# Patient Record
Sex: MALE | Race: WHITE | NOT HISPANIC OR LATINO | Employment: FULL TIME | URBAN - METROPOLITAN AREA
[De-identification: names, ages, dates, MRNs, and addresses within clinical notes are randomized per-mention and may not be internally consistent; named-entity substitution may affect disease eponyms.]

---

## 2017-01-26 ENCOUNTER — HOSPITAL ENCOUNTER (OUTPATIENT)
Dept: GENERAL RADIOLOGY | Facility: HOSPITAL | Age: 47
Discharge: HOME | End: 2017-01-26
Attending: FAMILY MEDICINE | Admitting: FAMILY MEDICINE

## 2017-01-26 ENCOUNTER — TRANSCRIBE ORDERS (OUTPATIENT)
Dept: GENERAL RADIOLOGY | Facility: HOSPITAL | Age: 47
End: 2017-01-26

## 2017-01-26 DIAGNOSIS — R07.9 CHEST PAIN, UNSPECIFIED: ICD-10-CM

## 2017-01-26 DIAGNOSIS — R07.9 CHEST PAIN, UNSPECIFIED: Primary | ICD-10-CM

## 2017-01-26 PROCEDURE — 71020 HC CHEST PA AND LATERAL: CPT

## 2021-03-04 ENCOUNTER — IMMUNIZATION (OUTPATIENT)
Dept: VACCINE CLINIC | Facility: HOSPITAL | Age: 51
End: 2021-03-04

## 2021-03-04 PROCEDURE — 91301 HC SARSCO02 VAC 100MCG/0.5ML IM: CPT | Performed by: INTERNAL MEDICINE

## 2021-03-04 PROCEDURE — 0011A: CPT | Performed by: INTERNAL MEDICINE

## 2021-04-01 ENCOUNTER — IMMUNIZATION (OUTPATIENT)
Dept: VACCINE CLINIC | Facility: HOSPITAL | Age: 51
End: 2021-04-01

## 2021-04-01 PROCEDURE — 0012A: CPT | Performed by: INTERNAL MEDICINE

## 2021-04-01 PROCEDURE — 91301 HC SARSCO02 VAC 100MCG/0.5ML IM: CPT | Performed by: INTERNAL MEDICINE

## 2023-09-29 ENCOUNTER — TRANSCRIBE ORDERS (OUTPATIENT)
Dept: ADMINISTRATIVE | Facility: HOSPITAL | Age: 53
End: 2023-09-29
Payer: COMMERCIAL

## 2023-09-29 ENCOUNTER — HOSPITAL ENCOUNTER (OUTPATIENT)
Dept: GENERAL RADIOLOGY | Facility: HOSPITAL | Age: 53
Discharge: HOME | End: 2023-09-29
Admitting: STUDENT IN AN ORGANIZED HEALTH CARE EDUCATION/TRAINING PROGRAM
Payer: COMMERCIAL

## 2023-09-29 DIAGNOSIS — M79.672 PAIN OF LEFT HEEL: Primary | ICD-10-CM

## 2023-09-29 PROCEDURE — 73630 X-RAY EXAM OF FOOT: CPT

## 2025-05-13 ENCOUNTER — HOSPITAL ENCOUNTER (EMERGENCY)
Facility: HOSPITAL | Age: 55
Discharge: HOME | End: 2025-05-13
Attending: EMERGENCY MEDICINE | Admitting: EMERGENCY MEDICINE
Payer: COMMERCIAL

## 2025-05-13 ENCOUNTER — APPOINTMENT (OUTPATIENT)
Dept: CT IMAGING | Facility: HOSPITAL | Age: 55
End: 2025-05-13
Payer: COMMERCIAL

## 2025-05-13 VITALS
TEMPERATURE: 97.8 F | BODY MASS INDEX: 33.64 KG/M2 | SYSTOLIC BLOOD PRESSURE: 138 MMHG | WEIGHT: 235 LBS | HEIGHT: 70 IN | DIASTOLIC BLOOD PRESSURE: 85 MMHG | HEART RATE: 63 BPM | OXYGEN SATURATION: 97 % | RESPIRATION RATE: 18 BRPM

## 2025-05-13 DIAGNOSIS — N13.39 OTHER HYDRONEPHROSIS: ICD-10-CM

## 2025-05-13 DIAGNOSIS — N20.1 URETEROLITHIASIS: Primary | ICD-10-CM

## 2025-05-13 LAB
ALBUMIN SERPL-MCNC: 4.2 G/DL (ref 3.5–5.2)
ALBUMIN/GLOB SERPL: 1.7 G/DL
ALP SERPL-CCNC: 52 U/L (ref 39–117)
ALT SERPL W P-5'-P-CCNC: 19 U/L (ref 1–41)
ANION GAP SERPL CALCULATED.3IONS-SCNC: 12 MMOL/L (ref 5–15)
AST SERPL-CCNC: 24 U/L (ref 1–40)
BACTERIA UR QL AUTO: ABNORMAL /HPF
BASOPHILS # BLD AUTO: 0.03 10*3/MM3 (ref 0–0.2)
BASOPHILS NFR BLD AUTO: 0.3 % (ref 0–1.5)
BILIRUB SERPL-MCNC: 0.5 MG/DL (ref 0–1.2)
BILIRUB UR QL STRIP: NEGATIVE
BUN SERPL-MCNC: 16 MG/DL (ref 6–20)
BUN/CREAT SERPL: 12.8 (ref 7–25)
CALCIUM SPEC-SCNC: 9.4 MG/DL (ref 8.6–10.5)
CHLORIDE SERPL-SCNC: 107 MMOL/L (ref 98–107)
CLARITY UR: CLEAR
CO2 SERPL-SCNC: 24 MMOL/L (ref 22–29)
COLOR UR: YELLOW
CREAT BLDA-MCNC: 1.4 MG/DL (ref 0.6–1.3)
CREAT SERPL-MCNC: 1.25 MG/DL (ref 0.76–1.27)
DEPRECATED RDW RBC AUTO: 41 FL (ref 37–54)
EGFRCR SERPLBLD CKD-EPI 2021: 67.6 ML/MIN/1.73
EOSINOPHIL # BLD AUTO: 0.02 10*3/MM3 (ref 0–0.4)
EOSINOPHIL NFR BLD AUTO: 0.2 % (ref 0.3–6.2)
ERYTHROCYTE [DISTWIDTH] IN BLOOD BY AUTOMATED COUNT: 13.1 % (ref 12.3–15.4)
GLOBULIN UR ELPH-MCNC: 2.5 GM/DL
GLUCOSE SERPL-MCNC: 90 MG/DL (ref 65–99)
GLUCOSE UR STRIP-MCNC: NEGATIVE MG/DL
HCT VFR BLD AUTO: 43.6 % (ref 37.5–51)
HGB BLD-MCNC: 15 G/DL (ref 13–17.7)
HGB UR QL STRIP.AUTO: ABNORMAL
HYALINE CASTS UR QL AUTO: ABNORMAL /LPF
IMM GRANULOCYTES # BLD AUTO: 0.04 10*3/MM3 (ref 0–0.05)
IMM GRANULOCYTES NFR BLD AUTO: 0.4 % (ref 0–0.5)
KETONES UR QL STRIP: ABNORMAL
LEUKOCYTE ESTERASE UR QL STRIP.AUTO: NEGATIVE
LIPASE SERPL-CCNC: 25 U/L (ref 13–60)
LYMPHOCYTES # BLD AUTO: 0.54 10*3/MM3 (ref 0.7–3.1)
LYMPHOCYTES NFR BLD AUTO: 5.9 % (ref 19.6–45.3)
MCH RBC QN AUTO: 29.8 PG (ref 26.6–33)
MCHC RBC AUTO-ENTMCNC: 34.4 G/DL (ref 31.5–35.7)
MCV RBC AUTO: 86.7 FL (ref 79–97)
MONOCYTES # BLD AUTO: 0.69 10*3/MM3 (ref 0.1–0.9)
MONOCYTES NFR BLD AUTO: 7.6 % (ref 5–12)
NEUTROPHILS NFR BLD AUTO: 7.79 10*3/MM3 (ref 1.7–7)
NEUTROPHILS NFR BLD AUTO: 85.6 % (ref 42.7–76)
NITRITE UR QL STRIP: NEGATIVE
NRBC BLD AUTO-RTO: 0 /100 WBC (ref 0–0.2)
PH UR STRIP.AUTO: <=5 [PH] (ref 5–8)
PLATELET # BLD AUTO: 254 10*3/MM3 (ref 140–450)
PMV BLD AUTO: 10.3 FL (ref 6–12)
POTASSIUM SERPL-SCNC: 4 MMOL/L (ref 3.5–5.2)
PROT SERPL-MCNC: 6.7 G/DL (ref 6–8.5)
PROT UR QL STRIP: NEGATIVE
RBC # BLD AUTO: 5.03 10*6/MM3 (ref 4.14–5.8)
RBC # UR STRIP: ABNORMAL /HPF
REF LAB TEST METHOD: ABNORMAL
SODIUM SERPL-SCNC: 143 MMOL/L (ref 136–145)
SP GR UR STRIP: 1.04 (ref 1–1.03)
SQUAMOUS #/AREA URNS HPF: ABNORMAL /HPF
UROBILINOGEN UR QL STRIP: ABNORMAL
WBC # UR STRIP: ABNORMAL /HPF
WBC NRBC COR # BLD AUTO: 9.11 10*3/MM3 (ref 3.4–10.8)

## 2025-05-13 PROCEDURE — 81001 URINALYSIS AUTO W/SCOPE: CPT | Performed by: EMERGENCY MEDICINE

## 2025-05-13 PROCEDURE — 82565 ASSAY OF CREATININE: CPT | Performed by: EMERGENCY MEDICINE

## 2025-05-13 PROCEDURE — 25010000002 HYDROMORPHONE PER 4 MG: Performed by: EMERGENCY MEDICINE

## 2025-05-13 PROCEDURE — 83690 ASSAY OF LIPASE: CPT | Performed by: EMERGENCY MEDICINE

## 2025-05-13 PROCEDURE — 25010000002 KETOROLAC TROMETHAMINE PER 15 MG: Performed by: EMERGENCY MEDICINE

## 2025-05-13 PROCEDURE — 25810000003 SODIUM CHLORIDE 0.9 % SOLUTION: Performed by: EMERGENCY MEDICINE

## 2025-05-13 PROCEDURE — 74177 CT ABD & PELVIS W/CONTRAST: CPT

## 2025-05-13 PROCEDURE — 25510000001 IOPAMIDOL 61 % SOLUTION: Performed by: EMERGENCY MEDICINE

## 2025-05-13 PROCEDURE — 99285 EMERGENCY DEPT VISIT HI MDM: CPT

## 2025-05-13 PROCEDURE — 96375 TX/PRO/DX INJ NEW DRUG ADDON: CPT

## 2025-05-13 PROCEDURE — 96374 THER/PROPH/DIAG INJ IV PUSH: CPT

## 2025-05-13 PROCEDURE — 87086 URINE CULTURE/COLONY COUNT: CPT | Performed by: FAMILY MEDICINE

## 2025-05-13 PROCEDURE — 80053 COMPREHEN METABOLIC PANEL: CPT | Performed by: EMERGENCY MEDICINE

## 2025-05-13 PROCEDURE — 25010000002 ONDANSETRON PER 1 MG: Performed by: EMERGENCY MEDICINE

## 2025-05-13 PROCEDURE — 85025 COMPLETE CBC W/AUTO DIFF WBC: CPT | Performed by: EMERGENCY MEDICINE

## 2025-05-13 RX ORDER — ONDANSETRON 4 MG/1
4 TABLET, FILM COATED ORAL EVERY 8 HOURS PRN
Qty: 15 TABLET | Refills: 0 | Status: SHIPPED | OUTPATIENT
Start: 2025-05-13

## 2025-05-13 RX ORDER — IOPAMIDOL 612 MG/ML
85 INJECTION, SOLUTION INTRAVASCULAR
Status: COMPLETED | OUTPATIENT
Start: 2025-05-13 | End: 2025-05-13

## 2025-05-13 RX ORDER — HYDROMORPHONE HYDROCHLORIDE 1 MG/ML
0.5 INJECTION, SOLUTION INTRAMUSCULAR; INTRAVENOUS; SUBCUTANEOUS ONCE
Refills: 0 | Status: COMPLETED | OUTPATIENT
Start: 2025-05-13 | End: 2025-05-13

## 2025-05-13 RX ORDER — SODIUM CHLORIDE 0.9 % (FLUSH) 0.9 %
10 SYRINGE (ML) INJECTION AS NEEDED
Status: DISCONTINUED | OUTPATIENT
Start: 2025-05-13 | End: 2025-05-13 | Stop reason: HOSPADM

## 2025-05-13 RX ORDER — KETOROLAC TROMETHAMINE 15 MG/ML
15 INJECTION, SOLUTION INTRAMUSCULAR; INTRAVENOUS ONCE
Status: COMPLETED | OUTPATIENT
Start: 2025-05-13 | End: 2025-05-13

## 2025-05-13 RX ORDER — ONDANSETRON 2 MG/ML
4 INJECTION INTRAMUSCULAR; INTRAVENOUS ONCE
Status: COMPLETED | OUTPATIENT
Start: 2025-05-13 | End: 2025-05-13

## 2025-05-13 RX ORDER — TAMSULOSIN HYDROCHLORIDE 0.4 MG/1
1 CAPSULE ORAL DAILY
Qty: 10 CAPSULE | Refills: 0 | Status: SHIPPED | OUTPATIENT
Start: 2025-05-13 | End: 2025-05-16 | Stop reason: HOSPADM

## 2025-05-13 RX ORDER — HYDROCODONE BITARTRATE AND ACETAMINOPHEN 5; 325 MG/1; MG/1
1 TABLET ORAL ONCE
Refills: 0 | Status: COMPLETED | OUTPATIENT
Start: 2025-05-13 | End: 2025-05-13

## 2025-05-13 RX ORDER — HYDROCODONE BITARTRATE AND ACETAMINOPHEN 5; 325 MG/1; MG/1
1 TABLET ORAL EVERY 6 HOURS PRN
Qty: 12 TABLET | Refills: 0 | Status: SHIPPED | OUTPATIENT
Start: 2025-05-13

## 2025-05-13 RX ADMIN — KETOROLAC TROMETHAMINE 15 MG: 15 INJECTION, SOLUTION INTRAMUSCULAR; INTRAVENOUS at 19:48

## 2025-05-13 RX ADMIN — ONDANSETRON 4 MG: 2 INJECTION INTRAMUSCULAR; INTRAVENOUS at 18:09

## 2025-05-13 RX ADMIN — IOPAMIDOL 85 ML: 612 INJECTION, SOLUTION INTRAVENOUS at 18:34

## 2025-05-13 RX ADMIN — HYDROMORPHONE HYDROCHLORIDE 0.5 MG: 1 INJECTION, SOLUTION INTRAMUSCULAR; INTRAVENOUS; SUBCUTANEOUS at 18:09

## 2025-05-13 RX ADMIN — HYDROCODONE BITARTRATE AND ACETAMINOPHEN 1 TABLET: 5; 325 TABLET ORAL at 19:48

## 2025-05-13 RX ADMIN — SODIUM CHLORIDE 1000 ML: 900 INJECTION, SOLUTION INTRAVENOUS at 18:09

## 2025-05-13 NOTE — Clinical Note
[facility] EMERGENCY DEPARTMENT  [address]  [facility] [address]  Phone: [phone]    Thierry Garduno was seen and treated in our emergency department on 5/13/2025.  He may return to work on 05/16/2025.         Thank you for choosing [facility].    Red Mendoza MD

## 2025-05-13 NOTE — ED PROVIDER NOTES
Subjective   History of Present Illness  54-year-old male sent to the emergency department from urgent care to be evaluated for right sided abdominal pain and flank pain.  The patient tells me that about 2 hours ago he was sitting in a chair and was at rest when he had acute onset of right lower quadrant abdominal pain and right flank pain that radiated to his groin.  He endorses accompanying nausea and vomiting.  No fevers.  No rash.  He currently rates his pain at 9 out of 10 in severity.  Given his symptoms, he went to urgent care and was subsequently referred here to the ED for further evaluation and treatment.      Review of Systems   Gastrointestinal:  Positive for abdominal pain, nausea and vomiting.   Genitourinary:  Positive for flank pain.   All other systems reviewed and are negative.      No past medical history on file.    No Known Allergies    No past surgical history on file.    No family history on file.    Social History     Socioeconomic History    Marital status:    Tobacco Use    Smoking status: Never    Smokeless tobacco: Never   Vaping Use    Vaping status: Never Used   Substance and Sexual Activity    Alcohol use: Yes    Drug use: Never    Sexual activity: Defer           Objective   Physical Exam  Vitals and nursing note reviewed.   Constitutional:       Appearance: He is well-developed. He is not diaphoretic.      Comments: Uncomfortable appearing male   HENT:      Head: Normocephalic and atraumatic.   Neck:      Vascular: No JVD.   Cardiovascular:      Rate and Rhythm: Normal rate and regular rhythm.      Heart sounds: Normal heart sounds. No murmur heard.     No friction rub. No gallop.   Pulmonary:      Effort: Pulmonary effort is normal. No respiratory distress.      Breath sounds: Normal breath sounds. No wheezing or rales.   Abdominal:      General: Bowel sounds are normal. There is no distension.      Palpations: Abdomen is soft. There is no mass.      Tenderness: There is no  abdominal tenderness. There is no guarding.      Comments: No focal abdominal tenderness, no peritoneal signs, no pain out of proportion to exam   Genitourinary:     Comments: No CVA tenderness present  Musculoskeletal:         General: Normal range of motion.      Cervical back: Normal range of motion.   Skin:     General: Skin is warm and dry.      Coloration: Skin is not pale.      Findings: No erythema or rash.      Comments: No dermatomal rash noted   Neurological:      Mental Status: He is alert and oriented to person, place, and time.   Psychiatric:         Mood and Affect: Mood normal.         Thought Content: Thought content normal.         Judgment: Judgment normal.         Procedures           ED Course  ED Course as of 05/13/25 2034   Tue May 13, 2025   1717 54-year-old male sent to the emergency department from urgent care to be evaluated for right sided abdominal pain and flank pain.  He tells me that about 2 hours ago he was sitting in a chair when he had acute onset of pain in his right lower quadrant and right flank that radiated to his right groin.  He endorses accompanying nausea and vomiting.  He currently rates his pain at 9 out of 10 in severity.  Given his symptoms, he went to urgent care and was referred here for further evaluation and treatment.  On arrival, the patient is uncomfortable appearing.  Nonsurgical abdomen.  No CVA tenderness noted.  No dermatomal rash present.  Differential diagnosis is quite broad.  We will obtain labs and imaging, and we will reassess following initial interventions. [DD]   1905 Labs interpreted independently by me remarkable only for hematuria. [DD]   1919 I personally and independently reviewed the patient's CT images and findings, and I am in agreement with the radiologist regarding CT interpretation--particularly regarding 4-5 mm right distal ureteral stone with accompanying hydronephrosis. [DD]   1941 Upon reevaluation, the patient looks and feels much  improved.  Given his well appearance and overall clinical picture I feel he can be safely discharged and managed on an outpatient basis.  Pain is adequately controlled.  He is tolerating p.o.  Scripts for Norco, Zofran, and Flomax.  I have referred the patient to Dr. Greer of urology and he will follow-up with him within the next week if he fails conservative measures.  Agreeable with plan and given appropriate strict return precautions. [DD]      ED Course User Index  [DD] Red Mendoza MD                                             Recent Results (from the past 24 hours)   POC Urinalysis Dipstick, Multipro (All Except Gianluca UCs)    Collection Time: 05/13/25  4:38 PM    Specimen: Urine   Result Value Ref Range    Color Domi     Clarity, UA Slightly Cloudy (A)     Glucose, UA Negative mg/dL    Bilirubin Negative     Ketones, UA Negative     Specific Gravity  1.030 1.005 - 1.030    Blood, UA 3+ (A)     pH, Urine 5.5 5.0 - 8.0    Protein, POC 1+ (A) mg/dL    Urobilinogen, UA Normal     Nitrite, UA Negative     Leukocytes Negative    Comprehensive Metabolic Panel    Collection Time: 05/13/25  6:02 PM    Specimen: Blood   Result Value Ref Range    Glucose 90 65 - 99 mg/dL    BUN 16 6 - 20 mg/dL    Creatinine 1.25 0.76 - 1.27 mg/dL    Sodium 143 136 - 145 mmol/L    Potassium 4.0 3.5 - 5.2 mmol/L    Chloride 107 98 - 107 mmol/L    CO2 24.0 22.0 - 29.0 mmol/L    Calcium 9.4 8.6 - 10.5 mg/dL    Total Protein 6.7 6.0 - 8.5 g/dL    Albumin 4.2 3.5 - 5.2 g/dL    ALT (SGPT) 19 1 - 41 U/L    AST (SGOT) 24 1 - 40 U/L    Alkaline Phosphatase 52 39 - 117 U/L    Total Bilirubin 0.5 0.0 - 1.2 mg/dL    Globulin 2.5 gm/dL    A/G Ratio 1.7 g/dL    BUN/Creatinine Ratio 12.8 7.0 - 25.0    Anion Gap 12.0 5.0 - 15.0 mmol/L    eGFR 67.6 >60.0 mL/min/1.73   Lipase    Collection Time: 05/13/25  6:02 PM    Specimen: Blood   Result Value Ref Range    Lipase 25 13 - 60 U/L   CBC Auto Differential    Collection Time: 05/13/25  6:02 PM     Specimen: Blood   Result Value Ref Range    WBC 9.11 3.40 - 10.80 10*3/mm3    RBC 5.03 4.14 - 5.80 10*6/mm3    Hemoglobin 15.0 13.0 - 17.7 g/dL    Hematocrit 43.6 37.5 - 51.0 %    MCV 86.7 79.0 - 97.0 fL    MCH 29.8 26.6 - 33.0 pg    MCHC 34.4 31.5 - 35.7 g/dL    RDW 13.1 12.3 - 15.4 %    RDW-SD 41.0 37.0 - 54.0 fl    MPV 10.3 6.0 - 12.0 fL    Platelets 254 140 - 450 10*3/mm3    Neutrophil % 85.6 (H) 42.7 - 76.0 %    Lymphocyte % 5.9 (L) 19.6 - 45.3 %    Monocyte % 7.6 5.0 - 12.0 %    Eosinophil % 0.2 (L) 0.3 - 6.2 %    Basophil % 0.3 0.0 - 1.5 %    Immature Grans % 0.4 0.0 - 0.5 %    Neutrophils, Absolute 7.79 (H) 1.70 - 7.00 10*3/mm3    Lymphocytes, Absolute 0.54 (L) 0.70 - 3.10 10*3/mm3    Monocytes, Absolute 0.69 0.10 - 0.90 10*3/mm3    Eosinophils, Absolute 0.02 0.00 - 0.40 10*3/mm3    Basophils, Absolute 0.03 0.00 - 0.20 10*3/mm3    Immature Grans, Absolute 0.04 0.00 - 0.05 10*3/mm3    nRBC 0.0 0.0 - 0.2 /100 WBC   POC Creatinine    Collection Time: 05/13/25  6:07 PM    Specimen: Blood   Result Value Ref Range    Creatinine 1.40 (H) 0.60 - 1.30 mg/dL   Urinalysis With Culture If Indicated - Urine, Clean Catch    Collection Time: 05/13/25  6:50 PM    Specimen: Urine, Clean Catch   Result Value Ref Range    Color, UA Yellow Yellow, Straw    Appearance, UA Clear Clear    pH, UA <=5.0 5.0 - 8.0    Specific Gravity, UA 1.043 (H) 1.001 - 1.030    Glucose, UA Negative Negative    Ketones, UA Trace (A) Negative    Bilirubin, UA Negative Negative    Blood, UA Large (3+) (A) Negative    Protein, UA Negative Negative    Leuk Esterase, UA Negative Negative    Nitrite, UA Negative Negative    Urobilinogen, UA 1.0 E.U./dL 0.2 - 1.0 E.U./dL   Urinalysis, Microscopic Only - Urine, Clean Catch    Collection Time: 05/13/25  6:50 PM    Specimen: Urine, Clean Catch   Result Value Ref Range    RBC, UA Too Numerous to Count (A) None Seen, 0-2 /HPF    WBC, UA 0-2 None Seen, 0-2 /HPF    Bacteria, UA None Seen None Seen, Trace /HPF     "Squamous Epithelial Cells, UA None Seen None Seen, 0-2 /HPF    Hyaline Casts, UA 0-6 0 - 6 /LPF    Methodology Automated Microscopy      Note: In addition to lab results from this visit, the labs listed above may include labs taken at another facility or during a different encounter within the last 24 hours. Please correlate lab times with ED admission and discharge times for further clarification of the services performed during this visit.    CT Abdomen Pelvis With Contrast   Final Result   1.Mild to moderate right-sided hydroureteronephrosis secondary to a 4 to 5 mm calculus within the distal ureter.   2.Diffuse wall thickening of the urinary bladder with perivesicular stranding. Findings may be related to underlying cystitis. Please correlate with urinalysis.   3.Diverticulosis without evidence of diverticulitis.   4.Mild prominence of the wall thickness of the esophagus. Findings are nonspecific but can be seen with underlying esophagitis.   5.Noncalcified nodules within the lung bases. Findings are favored to be postinflammatory or infectious in nature. Recommend follow-up in 6 to 12 months   6.Other findings as above.            Electronically Signed: Marcel Rodarte MD     5/13/2025 7:15 PM EDT     Workstation ID: [id number]        Vitals:    05/13/25 1709 05/13/25 1800 05/13/25 1900 05/13/25 1930   BP: 140/97  135/84 138/85   BP Location: Left arm      Patient Position: Sitting      Pulse: 67 62 55 63   Resp: 18      Temp: 97.8 °F (36.6 °C)      TempSrc: Oral      SpO2: 98% 99% 96% 97%   Weight: 107 kg (235 lb)      Height: 177.8 cm (70\")        Medications   sodium chloride 0.9 % flush 10 mL (has no administration in time range)   sodium chloride 0.9 % bolus 1,000 mL (0 mL Intravenous Stopped 5/13/25 1932)   ondansetron (ZOFRAN) injection 4 mg (4 mg Intravenous Given 5/13/25 1809)   HYDROmorphone (DILAUDID) injection 0.5 mg (0.5 mg Intravenous Given 5/13/25 1809)   iopamidol (ISOVUE-300) 61 % injection 85 " mL (85 mL Intravenous Given 5/13/25 1834)   ketorolac (TORADOL) injection 15 mg (15 mg Intravenous Given 5/13/25 1948)   HYDROcodone-acetaminophen (NORCO) 5-325 MG per tablet 1 tablet (1 tablet Oral Given 5/13/25 1948)     ECG/EMG Results (last 24 hours)       ** No results found for the last 24 hours. **          No orders to display                 Medical Decision Making  Problems Addressed:  Other hydronephrosis: complicated acute illness or injury  Ureterolithiasis: complicated acute illness or injury    Amount and/or Complexity of Data Reviewed  Labs: ordered.  Radiology: ordered.    Risk  Prescription drug management.        Final diagnoses:   Ureterolithiasis   Other hydronephrosis       ED Disposition  ED Disposition       ED Disposition   Discharge    Condition   Stable    Comment   --               Shiraz Greer MD  [address]  [address]  [place] [id number] [phone]    In 1 week  As needed         Medication List        New Prescriptions      HYDROcodone-acetaminophen 5-325 MG per tablet  Commonly known as: NORCO  Take 1 tablet by mouth Every 6 (Six) Hours As Needed for Moderate Pain.     ondansetron 4 MG tablet  Commonly known as: ZOFRAN  Take 1 tablet by mouth Every 8 (Eight) Hours As Needed for Nausea.     tamsulosin 0.4 MG capsule 24 hr capsule  Commonly known as: FLOMAX  Take 1 capsule by mouth Daily.               Where to Get Your Medications        These medications were sent to [id number] - [place], [place] - [address] AT [facility] [address] - [phone]  - [phone]   [address], [facility] [address]      Phone: [phone]   HYDROcodone-acetaminophen 5-325 MG per tablet  ondansetron 4 MG tablet  tamsulosin 0.4 MG capsule 24 hr capsule            Red Mendoza MD  05/13/25 2034

## 2025-05-14 ENCOUNTER — NURSE TRIAGE (OUTPATIENT)
Dept: CALL CENTER | Facility: HOSPITAL | Age: 55
End: 2025-05-14
Payer: COMMERCIAL

## 2025-05-14 ENCOUNTER — PATIENT ROUNDING (BHMG ONLY) (OUTPATIENT)
Dept: URGENT CARE | Facility: CLINIC | Age: 55
End: 2025-05-14
Payer: COMMERCIAL

## 2025-05-14 ENCOUNTER — APPOINTMENT (OUTPATIENT)
Dept: CT IMAGING | Facility: HOSPITAL | Age: 55
End: 2025-05-14
Payer: COMMERCIAL

## 2025-05-14 ENCOUNTER — HOSPITAL ENCOUNTER (OUTPATIENT)
Facility: HOSPITAL | Age: 55
Setting detail: OBSERVATION
Discharge: HOME | End: 2025-05-16
Attending: EMERGENCY MEDICINE | Admitting: INTERNAL MEDICINE
Payer: COMMERCIAL

## 2025-05-14 DIAGNOSIS — Z86.39 HISTORY OF HYPERLIPIDEMIA: ICD-10-CM

## 2025-05-14 DIAGNOSIS — N28.9 ACUTE RENAL INSUFFICIENCY: ICD-10-CM

## 2025-05-14 DIAGNOSIS — R52 INTRACTABLE PAIN: ICD-10-CM

## 2025-05-14 DIAGNOSIS — R11.2 INTRACTABLE NAUSEA AND VOMITING: ICD-10-CM

## 2025-05-14 DIAGNOSIS — Z86.79 HISTORY OF HYPERTENSION: ICD-10-CM

## 2025-05-14 DIAGNOSIS — N23 RENAL COLIC ON RIGHT SIDE: ICD-10-CM

## 2025-05-14 DIAGNOSIS — N20.1 RIGHT URETERAL STONE: Primary | ICD-10-CM

## 2025-05-14 DIAGNOSIS — R91.8 PULMONARY NODULES: ICD-10-CM

## 2025-05-14 DIAGNOSIS — N20.1 URETERAL STONE: ICD-10-CM

## 2025-05-14 LAB
BASOPHILS # BLD AUTO: 0.04 10*3/MM3 (ref 0–0.2)
BASOPHILS NFR BLD AUTO: 0.4 % (ref 0–1.5)
DEPRECATED RDW RBC AUTO: 43.3 FL (ref 37–54)
EOSINOPHIL # BLD AUTO: 0.04 10*3/MM3 (ref 0–0.4)
EOSINOPHIL NFR BLD AUTO: 0.4 % (ref 0.3–6.2)
ERYTHROCYTE [DISTWIDTH] IN BLOOD BY AUTOMATED COUNT: 13.2 % (ref 12.3–15.4)
HCT VFR BLD AUTO: 40.5 % (ref 37.5–51)
HGB BLD-MCNC: 13.4 G/DL (ref 13–17.7)
IMM GRANULOCYTES # BLD AUTO: 0.06 10*3/MM3 (ref 0–0.05)
IMM GRANULOCYTES NFR BLD AUTO: 0.6 % (ref 0–0.5)
LYMPHOCYTES # BLD AUTO: 0.6 10*3/MM3 (ref 0.7–3.1)
LYMPHOCYTES NFR BLD AUTO: 5.6 % (ref 19.6–45.3)
MCH RBC QN AUTO: 29.5 PG (ref 26.6–33)
MCHC RBC AUTO-ENTMCNC: 33.1 G/DL (ref 31.5–35.7)
MCV RBC AUTO: 89 FL (ref 79–97)
MONOCYTES # BLD AUTO: 0.72 10*3/MM3 (ref 0.1–0.9)
MONOCYTES NFR BLD AUTO: 6.7 % (ref 5–12)
NEUTROPHILS NFR BLD AUTO: 86.3 % (ref 42.7–76)
NEUTROPHILS NFR BLD AUTO: 9.35 10*3/MM3 (ref 1.7–7)
NRBC BLD AUTO-RTO: 0 /100 WBC (ref 0–0.2)
PLATELET # BLD AUTO: 232 10*3/MM3 (ref 140–450)
PMV BLD AUTO: 10.6 FL (ref 6–12)
RBC # BLD AUTO: 4.55 10*6/MM3 (ref 4.14–5.8)
WBC NRBC COR # BLD AUTO: 10.81 10*3/MM3 (ref 3.4–10.8)

## 2025-05-14 PROCEDURE — 25010000002 ONDANSETRON PER 1 MG: Performed by: EMERGENCY MEDICINE

## 2025-05-14 PROCEDURE — 83605 ASSAY OF LACTIC ACID: CPT | Performed by: EMERGENCY MEDICINE

## 2025-05-14 PROCEDURE — 83690 ASSAY OF LIPASE: CPT | Performed by: EMERGENCY MEDICINE

## 2025-05-14 PROCEDURE — 85025 COMPLETE CBC W/AUTO DIFF WBC: CPT | Performed by: EMERGENCY MEDICINE

## 2025-05-14 PROCEDURE — 25810000003 SODIUM CHLORIDE 0.9 % SOLUTION: Performed by: EMERGENCY MEDICINE

## 2025-05-14 PROCEDURE — 96375 TX/PRO/DX INJ NEW DRUG ADDON: CPT

## 2025-05-14 PROCEDURE — 96374 THER/PROPH/DIAG INJ IV PUSH: CPT

## 2025-05-14 PROCEDURE — 25010000002 MORPHINE PER 10 MG: Performed by: EMERGENCY MEDICINE

## 2025-05-14 PROCEDURE — 80053 COMPREHEN METABOLIC PANEL: CPT | Performed by: EMERGENCY MEDICINE

## 2025-05-14 PROCEDURE — 74176 CT ABD & PELVIS W/O CONTRAST: CPT

## 2025-05-14 PROCEDURE — 99285 EMERGENCY DEPT VISIT HI MDM: CPT

## 2025-05-14 RX ORDER — ONDANSETRON 2 MG/ML
4 INJECTION INTRAMUSCULAR; INTRAVENOUS ONCE
Status: COMPLETED | OUTPATIENT
Start: 2025-05-14 | End: 2025-05-14

## 2025-05-14 RX ORDER — SODIUM CHLORIDE 9 MG/ML
10 INJECTION, SOLUTION INTRAMUSCULAR; INTRAVENOUS; SUBCUTANEOUS AS NEEDED
Status: DISCONTINUED | OUTPATIENT
Start: 2025-05-14 | End: 2025-05-16 | Stop reason: HOSPADM

## 2025-05-14 RX ORDER — SODIUM CHLORIDE 0.9 % (FLUSH) 0.9 %
10 SYRINGE (ML) INJECTION AS NEEDED
Status: DISCONTINUED | OUTPATIENT
Start: 2025-05-14 | End: 2025-05-16 | Stop reason: HOSPADM

## 2025-05-14 RX ORDER — TAMSULOSIN HYDROCHLORIDE 0.4 MG/1
0.4 CAPSULE ORAL ONCE
Status: COMPLETED | OUTPATIENT
Start: 2025-05-14 | End: 2025-05-14

## 2025-05-14 RX ORDER — MORPHINE SULFATE 4 MG/ML
4 INJECTION, SOLUTION INTRAMUSCULAR; INTRAVENOUS ONCE
Status: COMPLETED | OUTPATIENT
Start: 2025-05-14 | End: 2025-05-14

## 2025-05-14 RX ADMIN — MORPHINE SULFATE 4 MG: 4 INJECTION, SOLUTION INTRAMUSCULAR; INTRAVENOUS at 23:41

## 2025-05-14 RX ADMIN — SODIUM CHLORIDE 1000 ML: 9 INJECTION, SOLUTION INTRAVENOUS at 23:41

## 2025-05-14 RX ADMIN — TAMSULOSIN HYDROCHLORIDE 0.4 MG: 0.4 CAPSULE ORAL at 23:41

## 2025-05-14 RX ADMIN — ONDANSETRON 4 MG: 2 INJECTION INTRAMUSCULAR; INTRAVENOUS at 23:41

## 2025-05-14 NOTE — Clinical Note
Level of Care: Telemetry [5]   Diagnosis: Right nephrolithiasis [[id number]]   Admitting Physician: KATIE CALABRESE [[id number]]   Attending Physician: KATIE CALABRESE [[id number]]   Is patient appropriate for Inpatient Observation Unit?: Yes [1]

## 2025-05-15 ENCOUNTER — APPOINTMENT (OUTPATIENT)
Dept: GENERAL RADIOLOGY | Facility: HOSPITAL | Age: 55
End: 2025-05-15
Payer: COMMERCIAL

## 2025-05-15 ENCOUNTER — ANESTHESIA (OUTPATIENT)
Dept: PERIOP | Facility: HOSPITAL | Age: 55
End: 2025-05-15
Payer: COMMERCIAL

## 2025-05-15 ENCOUNTER — ANESTHESIA EVENT (OUTPATIENT)
Dept: PERIOP | Facility: HOSPITAL | Age: 55
End: 2025-05-15
Payer: COMMERCIAL

## 2025-05-15 PROBLEM — N20.0 RIGHT NEPHROLITHIASIS: Status: ACTIVE | Noted: 2025-05-15

## 2025-05-15 PROBLEM — N20.0 LEFT NEPHROLITHIASIS: Status: ACTIVE | Noted: 2025-05-15

## 2025-05-15 PROBLEM — E78.2 MIXED HYPERLIPIDEMIA: Status: ACTIVE | Noted: 2025-05-15

## 2025-05-15 PROBLEM — I10 PRIMARY HYPERTENSION: Status: ACTIVE | Noted: 2025-05-15

## 2025-05-15 PROBLEM — N17.9 AKI (ACUTE KIDNEY INJURY): Status: ACTIVE | Noted: 2025-05-15

## 2025-05-15 PROBLEM — R91.8 PULMONARY NODULES: Status: ACTIVE | Noted: 2025-05-15

## 2025-05-15 LAB
ALBUMIN SERPL-MCNC: 4.2 G/DL (ref 3.5–5.2)
ALBUMIN/GLOB SERPL: 1.7 G/DL
ALP SERPL-CCNC: 52 U/L (ref 39–117)
ALT SERPL W P-5'-P-CCNC: 18 U/L (ref 1–41)
ANION GAP SERPL CALCULATED.3IONS-SCNC: 13 MMOL/L (ref 5–15)
ANION GAP SERPL CALCULATED.3IONS-SCNC: 9 MMOL/L (ref 5–15)
AST SERPL-CCNC: 25 U/L (ref 1–40)
BACTERIA UR QL AUTO: ABNORMAL /HPF
BASOPHILS # BLD AUTO: 0.06 10*3/MM3 (ref 0–0.2)
BASOPHILS NFR BLD AUTO: 0.8 % (ref 0–1.5)
BILIRUB SERPL-MCNC: 0.5 MG/DL (ref 0–1.2)
BILIRUB UR QL STRIP: NEGATIVE
BUN SERPL-MCNC: 19 MG/DL (ref 6–20)
BUN SERPL-MCNC: 19 MG/DL (ref 6–20)
BUN/CREAT SERPL: 11.8 (ref 7–25)
BUN/CREAT SERPL: 13.1 (ref 7–25)
CALCIUM SPEC-SCNC: 8.5 MG/DL (ref 8.6–10.5)
CALCIUM SPEC-SCNC: 9.2 MG/DL (ref 8.6–10.5)
CHLORIDE SERPL-SCNC: 107 MMOL/L (ref 98–107)
CHLORIDE SERPL-SCNC: 109 MMOL/L (ref 98–107)
CLARITY UR: CLEAR
CO2 SERPL-SCNC: 22 MMOL/L (ref 22–29)
CO2 SERPL-SCNC: 22 MMOL/L (ref 22–29)
COLOR UR: YELLOW
CREAT SERPL-MCNC: 1.45 MG/DL (ref 0.76–1.27)
CREAT SERPL-MCNC: 1.61 MG/DL (ref 0.76–1.27)
D-LACTATE SERPL-SCNC: 1.5 MMOL/L (ref 0.5–2)
DEPRECATED RDW RBC AUTO: 45.3 FL (ref 37–54)
EGFRCR SERPLBLD CKD-EPI 2021: 49.9 ML/MIN/1.73
EGFRCR SERPLBLD CKD-EPI 2021: 56.6 ML/MIN/1.73
EOSINOPHIL # BLD AUTO: 0.17 10*3/MM3 (ref 0–0.4)
EOSINOPHIL NFR BLD AUTO: 2.2 % (ref 0.3–6.2)
ERYTHROCYTE [DISTWIDTH] IN BLOOD BY AUTOMATED COUNT: 13.4 % (ref 12.3–15.4)
GLOBULIN UR ELPH-MCNC: 2.5 GM/DL
GLUCOSE SERPL-MCNC: 124 MG/DL (ref 65–99)
GLUCOSE SERPL-MCNC: 92 MG/DL (ref 65–99)
GLUCOSE UR STRIP-MCNC: NEGATIVE MG/DL
HCT VFR BLD AUTO: 37.8 % (ref 37.5–51)
HGB BLD-MCNC: 12.3 G/DL (ref 13–17.7)
HGB UR QL STRIP.AUTO: ABNORMAL
HOLD SPECIMEN: NORMAL
HYALINE CASTS UR QL AUTO: ABNORMAL /LPF
IMM GRANULOCYTES # BLD AUTO: 0.03 10*3/MM3 (ref 0–0.05)
IMM GRANULOCYTES NFR BLD AUTO: 0.4 % (ref 0–0.5)
KETONES UR QL STRIP: ABNORMAL
LEUKOCYTE ESTERASE UR QL STRIP.AUTO: NEGATIVE
LIPASE SERPL-CCNC: 17 U/L (ref 13–60)
LYMPHOCYTES # BLD AUTO: 1.2 10*3/MM3 (ref 0.7–3.1)
LYMPHOCYTES NFR BLD AUTO: 15.5 % (ref 19.6–45.3)
MAGNESIUM SERPL-MCNC: 1.8 MG/DL (ref 1.6–2.6)
MCH RBC QN AUTO: 29.8 PG (ref 26.6–33)
MCHC RBC AUTO-ENTMCNC: 32.5 G/DL (ref 31.5–35.7)
MCV RBC AUTO: 91.5 FL (ref 79–97)
MONOCYTES # BLD AUTO: 0.88 10*3/MM3 (ref 0.1–0.9)
MONOCYTES NFR BLD AUTO: 11.4 % (ref 5–12)
NEUTROPHILS NFR BLD AUTO: 5.41 10*3/MM3 (ref 1.7–7)
NEUTROPHILS NFR BLD AUTO: 69.7 % (ref 42.7–76)
NITRITE UR QL STRIP: NEGATIVE
NRBC BLD AUTO-RTO: 0 /100 WBC (ref 0–0.2)
PH UR STRIP.AUTO: 5.5 [PH] (ref 5–8)
PHOSPHATE SERPL-MCNC: 2.9 MG/DL (ref 2.5–4.5)
PLATELET # BLD AUTO: 188 10*3/MM3 (ref 140–450)
PMV BLD AUTO: 10.3 FL (ref 6–12)
POTASSIUM SERPL-SCNC: 3.9 MMOL/L (ref 3.5–5.2)
POTASSIUM SERPL-SCNC: 4 MMOL/L (ref 3.5–5.2)
PROT SERPL-MCNC: 6.7 G/DL (ref 6–8.5)
PROT UR QL STRIP: ABNORMAL
QT INTERVAL: 430 MS
QTC INTERVAL: 440 MS
RBC # BLD AUTO: 4.13 10*6/MM3 (ref 4.14–5.8)
RBC # UR STRIP: ABNORMAL /HPF
REF LAB TEST METHOD: ABNORMAL
SODIUM SERPL-SCNC: 140 MMOL/L (ref 136–145)
SODIUM SERPL-SCNC: 142 MMOL/L (ref 136–145)
SP GR UR STRIP: 1.02 (ref 1–1.03)
SQUAMOUS #/AREA URNS HPF: ABNORMAL /HPF
UROBILINOGEN UR QL STRIP: ABNORMAL
WBC # UR STRIP: ABNORMAL /HPF
WBC NRBC COR # BLD AUTO: 7.75 10*3/MM3 (ref 3.4–10.8)
WHOLE BLOOD HOLD COAG: NORMAL
WHOLE BLOOD HOLD SPECIMEN: NORMAL

## 2025-05-15 PROCEDURE — G0378 HOSPITAL OBSERVATION PER HR: HCPCS

## 2025-05-15 PROCEDURE — 93005 ELECTROCARDIOGRAM TRACING: CPT | Performed by: FAMILY MEDICINE

## 2025-05-15 PROCEDURE — 25010000002 PROPOFOL 10 MG/ML EMULSION: Performed by: ANESTHESIOLOGY

## 2025-05-15 PROCEDURE — C1769 GUIDE WIRE: HCPCS | Performed by: UROLOGY

## 2025-05-15 PROCEDURE — 81001 URINALYSIS AUTO W/SCOPE: CPT | Performed by: EMERGENCY MEDICINE

## 2025-05-15 PROCEDURE — 84100 ASSAY OF PHOSPHORUS: CPT | Performed by: STUDENT IN AN ORGANIZED HEALTH CARE EDUCATION/TRAINING PROGRAM

## 2025-05-15 PROCEDURE — 25010000002 HYDROMORPHONE PER 4 MG: Performed by: STUDENT IN AN ORGANIZED HEALTH CARE EDUCATION/TRAINING PROGRAM

## 2025-05-15 PROCEDURE — 25010000002 LIDOCAINE 1 % SOLUTION: Performed by: ANESTHESIOLOGY

## 2025-05-15 PROCEDURE — 93010 ELECTROCARDIOGRAM REPORT: CPT | Performed by: INTERNAL MEDICINE

## 2025-05-15 PROCEDURE — 25010000002 HYDROMORPHONE PER 4 MG: Performed by: EMERGENCY MEDICINE

## 2025-05-15 PROCEDURE — 25010000002 KETOROLAC TROMETHAMINE PER 15 MG: Performed by: STUDENT IN AN ORGANIZED HEALTH CARE EDUCATION/TRAINING PROGRAM

## 2025-05-15 PROCEDURE — 25010000002 CEFTRIAXONE PER 250 MG: Performed by: UROLOGY

## 2025-05-15 PROCEDURE — 80048 BASIC METABOLIC PNL TOTAL CA: CPT | Performed by: STUDENT IN AN ORGANIZED HEALTH CARE EDUCATION/TRAINING PROGRAM

## 2025-05-15 PROCEDURE — 99222 1ST HOSP IP/OBS MODERATE 55: CPT | Performed by: STUDENT IN AN ORGANIZED HEALTH CARE EDUCATION/TRAINING PROGRAM

## 2025-05-15 PROCEDURE — 25810000003 LACTATED RINGERS PER 1000 ML: Performed by: STUDENT IN AN ORGANIZED HEALTH CARE EDUCATION/TRAINING PROGRAM

## 2025-05-15 PROCEDURE — 25010000002 ONDANSETRON PER 1 MG: Performed by: STUDENT IN AN ORGANIZED HEALTH CARE EDUCATION/TRAINING PROGRAM

## 2025-05-15 PROCEDURE — C2617 STENT, NON-COR, TEM W/O DEL: HCPCS | Performed by: UROLOGY

## 2025-05-15 PROCEDURE — 25010000002 HYDROMORPHONE PER 4 MG: Performed by: UROLOGY

## 2025-05-15 PROCEDURE — 85025 COMPLETE CBC W/AUTO DIFF WBC: CPT | Performed by: STUDENT IN AN ORGANIZED HEALTH CARE EDUCATION/TRAINING PROGRAM

## 2025-05-15 PROCEDURE — 25010000002 FAMOTIDINE 10 MG/ML SOLUTION: Performed by: ANESTHESIOLOGY

## 2025-05-15 PROCEDURE — 83735 ASSAY OF MAGNESIUM: CPT | Performed by: STUDENT IN AN ORGANIZED HEALTH CARE EDUCATION/TRAINING PROGRAM

## 2025-05-15 PROCEDURE — 96375 TX/PRO/DX INJ NEW DRUG ADDON: CPT

## 2025-05-15 PROCEDURE — 76000 FLUOROSCOPY <1 HR PHYS/QHP: CPT

## 2025-05-15 PROCEDURE — 25010000002 DEXAMETHASONE PER 1 MG: Performed by: ANESTHESIOLOGY

## 2025-05-15 PROCEDURE — 96376 TX/PRO/DX INJ SAME DRUG ADON: CPT

## 2025-05-15 PROCEDURE — 82365 CALCULUS SPECTROSCOPY: CPT | Performed by: UROLOGY

## 2025-05-15 DEVICE — URETERAL STENT
Type: IMPLANTABLE DEVICE | Site: URETER | Status: FUNCTIONAL
Brand: PERCUFLEX™ PLUS

## 2025-05-15 RX ORDER — HYDROMORPHONE HYDROCHLORIDE 1 MG/ML
0.25 INJECTION, SOLUTION INTRAMUSCULAR; INTRAVENOUS; SUBCUTANEOUS
Status: DISCONTINUED | OUTPATIENT
Start: 2025-05-15 | End: 2025-05-16 | Stop reason: HOSPADM

## 2025-05-15 RX ORDER — MAGNESIUM HYDROXIDE 1200 MG/15ML
LIQUID ORAL AS NEEDED
Status: DISCONTINUED | OUTPATIENT
Start: 2025-05-15 | End: 2025-05-15 | Stop reason: HOSPADM

## 2025-05-15 RX ORDER — ONDANSETRON 2 MG/ML
4 INJECTION INTRAMUSCULAR; INTRAVENOUS EVERY 6 HOURS PRN
Status: DISCONTINUED | OUTPATIENT
Start: 2025-05-15 | End: 2025-05-16 | Stop reason: HOSPADM

## 2025-05-15 RX ORDER — DEXAMETHASONE SODIUM PHOSPHATE 10 MG/ML
INJECTION, SOLUTION INTRA-ARTICULAR; INTRALESIONAL; INTRAMUSCULAR; INTRAVENOUS; SOFT TISSUE AS NEEDED
Status: DISCONTINUED | OUTPATIENT
Start: 2025-05-15 | End: 2025-05-15 | Stop reason: SURG

## 2025-05-15 RX ORDER — SCOPOLAMINE 1 MG/3D
1 PATCH, EXTENDED RELEASE TRANSDERMAL ONCE
Status: DISCONTINUED | OUTPATIENT
Start: 2025-05-15 | End: 2025-05-15

## 2025-05-15 RX ORDER — PROPOFOL 10 MG/ML
VIAL (ML) INTRAVENOUS AS NEEDED
Status: DISCONTINUED | OUTPATIENT
Start: 2025-05-15 | End: 2025-05-15 | Stop reason: SURG

## 2025-05-15 RX ORDER — BISACODYL 10 MG
10 SUPPOSITORY, RECTAL RECTAL DAILY PRN
Status: DISCONTINUED | OUTPATIENT
Start: 2025-05-15 | End: 2025-05-16 | Stop reason: HOSPADM

## 2025-05-15 RX ORDER — BISACODYL 5 MG/1
5 TABLET, DELAYED RELEASE ORAL DAILY PRN
Status: DISCONTINUED | OUTPATIENT
Start: 2025-05-15 | End: 2025-05-16 | Stop reason: HOSPADM

## 2025-05-15 RX ORDER — SODIUM CHLORIDE 0.9 % (FLUSH) 0.9 %
10 SYRINGE (ML) INJECTION AS NEEDED
Status: DISCONTINUED | OUTPATIENT
Start: 2025-05-15 | End: 2025-05-16 | Stop reason: HOSPADM

## 2025-05-15 RX ORDER — LIDOCAINE HYDROCHLORIDE 10 MG/ML
INJECTION, SOLUTION INFILTRATION; PERINEURAL AS NEEDED
Status: DISCONTINUED | OUTPATIENT
Start: 2025-05-15 | End: 2025-05-15 | Stop reason: SURG

## 2025-05-15 RX ORDER — FAMOTIDINE 10 MG/ML
20 INJECTION, SOLUTION INTRAVENOUS ONCE
Status: COMPLETED | OUTPATIENT
Start: 2025-05-15 | End: 2025-05-15

## 2025-05-15 RX ORDER — ROSUVASTATIN CALCIUM 20 MG/1
20 TABLET, COATED ORAL DAILY
COMMUNITY

## 2025-05-15 RX ORDER — ALUMINA, MAGNESIA, AND SIMETHICONE 2400; 2400; 240 MG/30ML; MG/30ML; MG/30ML
15 SUSPENSION ORAL EVERY 6 HOURS PRN
Status: DISCONTINUED | OUTPATIENT
Start: 2025-05-15 | End: 2025-05-16 | Stop reason: HOSPADM

## 2025-05-15 RX ORDER — HYDROMORPHONE HYDROCHLORIDE 1 MG/ML
0.5 INJECTION, SOLUTION INTRAMUSCULAR; INTRAVENOUS; SUBCUTANEOUS ONCE
Refills: 0 | Status: COMPLETED | OUTPATIENT
Start: 2025-05-15 | End: 2025-05-15

## 2025-05-15 RX ORDER — SODIUM CHLORIDE 0.9 % (FLUSH) 0.9 %
10 SYRINGE (ML) INJECTION EVERY 12 HOURS SCHEDULED
Status: DISCONTINUED | OUTPATIENT
Start: 2025-05-15 | End: 2025-05-16 | Stop reason: HOSPADM

## 2025-05-15 RX ORDER — SODIUM CHLORIDE, SODIUM LACTATE, POTASSIUM CHLORIDE, CALCIUM CHLORIDE 600; 310; 30; 20 MG/100ML; MG/100ML; MG/100ML; MG/100ML
125 INJECTION, SOLUTION INTRAVENOUS CONTINUOUS
Status: ACTIVE | OUTPATIENT
Start: 2025-05-15 | End: 2025-05-15

## 2025-05-15 RX ORDER — ACETAMINOPHEN 325 MG/1
650 TABLET ORAL EVERY 4 HOURS PRN
Status: DISCONTINUED | OUTPATIENT
Start: 2025-05-15 | End: 2025-05-16 | Stop reason: HOSPADM

## 2025-05-15 RX ORDER — HYDROMORPHONE HYDROCHLORIDE 1 MG/ML
0.5 INJECTION, SOLUTION INTRAMUSCULAR; INTRAVENOUS; SUBCUTANEOUS
Status: DISCONTINUED | OUTPATIENT
Start: 2025-05-15 | End: 2025-05-16

## 2025-05-15 RX ORDER — KETOROLAC TROMETHAMINE 15 MG/ML
15 INJECTION, SOLUTION INTRAMUSCULAR; INTRAVENOUS ONCE
Status: COMPLETED | OUTPATIENT
Start: 2025-05-15 | End: 2025-05-15

## 2025-05-15 RX ORDER — NITROGLYCERIN 0.4 MG/1
0.4 TABLET SUBLINGUAL
Status: DISCONTINUED | OUTPATIENT
Start: 2025-05-15 | End: 2025-05-16 | Stop reason: HOSPADM

## 2025-05-15 RX ORDER — AMOXICILLIN 250 MG
2 CAPSULE ORAL 2 TIMES DAILY PRN
Status: DISCONTINUED | OUTPATIENT
Start: 2025-05-15 | End: 2025-05-16 | Stop reason: HOSPADM

## 2025-05-15 RX ORDER — POLYETHYLENE GLYCOL 3350 17 G/17G
17 POWDER, FOR SOLUTION ORAL DAILY PRN
Status: DISCONTINUED | OUTPATIENT
Start: 2025-05-15 | End: 2025-05-16 | Stop reason: HOSPADM

## 2025-05-15 RX ORDER — TAMSULOSIN HYDROCHLORIDE 0.4 MG/1
0.4 CAPSULE ORAL DAILY
Status: DISCONTINUED | OUTPATIENT
Start: 2025-05-15 | End: 2025-05-16

## 2025-05-15 RX ORDER — SODIUM CHLORIDE 9 MG/ML
40 INJECTION, SOLUTION INTRAVENOUS AS NEEDED
Status: DISCONTINUED | OUTPATIENT
Start: 2025-05-15 | End: 2025-05-16 | Stop reason: HOSPADM

## 2025-05-15 RX ADMIN — ONDANSETRON 4 MG: 2 INJECTION INTRAMUSCULAR; INTRAVENOUS at 18:51

## 2025-05-15 RX ADMIN — HYDROMORPHONE HYDROCHLORIDE 0.25 MG: 1 INJECTION, SOLUTION INTRAMUSCULAR; INTRAVENOUS; SUBCUTANEOUS at 12:36

## 2025-05-15 RX ADMIN — LIDOCAINE HYDROCHLORIDE 50 MG: 10 INJECTION, SOLUTION INFILTRATION; PERINEURAL at 18:51

## 2025-05-15 RX ADMIN — KETOROLAC TROMETHAMINE 15 MG: 15 INJECTION, SOLUTION INTRAMUSCULAR; INTRAVENOUS at 02:53

## 2025-05-15 RX ADMIN — DEXAMETHASONE SODIUM PHOSPHATE 4 MG: 10 INJECTION INTRAMUSCULAR; INTRAVENOUS at 18:51

## 2025-05-15 RX ADMIN — PROPOFOL 250 MG: 10 INJECTION, EMULSION INTRAVENOUS at 18:51

## 2025-05-15 RX ADMIN — SCOPOLAMINE 1 PATCH: 1.5 PATCH, EXTENDED RELEASE TRANSDERMAL at 16:35

## 2025-05-15 RX ADMIN — SODIUM CHLORIDE, POTASSIUM CHLORIDE, SODIUM LACTATE AND CALCIUM CHLORIDE 125 ML/HR: 600; 310; 30; 20 INJECTION, SOLUTION INTRAVENOUS at 03:32

## 2025-05-15 RX ADMIN — FAMOTIDINE 20 MG: 10 INJECTION, SOLUTION INTRAVENOUS at 16:35

## 2025-05-15 RX ADMIN — SODIUM CHLORIDE 1000 MG: 900 INJECTION INTRAVENOUS at 14:05

## 2025-05-15 RX ADMIN — Medication 10 ML: at 02:53

## 2025-05-15 RX ADMIN — TAMSULOSIN HYDROCHLORIDE 0.4 MG: 0.4 CAPSULE ORAL at 08:59

## 2025-05-15 RX ADMIN — PROPOFOL 150 MG: 10 INJECTION, EMULSION INTRAVENOUS at 18:53

## 2025-05-15 RX ADMIN — HYDROMORPHONE HYDROCHLORIDE 0.5 MG: 1 INJECTION, SOLUTION INTRAMUSCULAR; INTRAVENOUS; SUBCUTANEOUS at 01:28

## 2025-05-15 NOTE — ANESTHESIA PREPROCEDURE EVALUATION
Anesthesia Evaluation     Patient summary reviewed and Nursing notes reviewed   no history of anesthetic complications:   NPO Solid Status: > 8 hours  NPO Liquid Status: > 8 hours           Airway   Mallampati: II  TM distance: >3 FB  Neck ROM: full  No difficulty expected  Dental      Pulmonary - negative pulmonary ROS and normal exam   Cardiovascular - normal exam    (+) hypertension, hyperlipidemia      Neuro/Psych- negative ROS  GI/Hepatic/Renal/Endo    (+) renal disease-    Musculoskeletal     Abdominal    Substance History      OB/GYN          Other                    Anesthesia Plan    ASA 2     general     intravenous induction     Anesthetic plan, risks, benefits, and alternatives have been provided, discussed and informed consent has been obtained with: patient.    Plan discussed with CRNA.    CODE STATUS:    Code Status (Patient has no pulse and is not breathing): CPR (Attempt to Resuscitate)  Medical Interventions (Patient has pulse or is breathing): Full Support  Level Of Support Discussed With: Patient       repeat wnl  NPO pending cardiology eval, FS Q6H  hold oral meds while inpatient  FS Q6H w/ISS

## 2025-05-15 NOTE — TELEPHONE ENCOUNTER
"Reason for Disposition   [1] SEVERE vomiting (e.g., 6 or more times/day) AND [2] present > 8 hours (Exception: Patient sounds well, is drinking liquids, does not sound dehydrated, and vomiting has lasted less than 24 hours.)    Additional Information   Negative: Shock suspected (e.g., cold/pale/clammy skin, too weak to stand, low BP, rapid pulse)   Negative: Difficult to awaken or acting confused (e.g., disoriented, slurred speech)   Negative: Sounds like a life-threatening emergency to the triager   Negative: Vomiting occurs only while coughing   Negative: [1] Pregnant < 20 Weeks AND [2] nausea/vomiting began in early pregnancy (i.e., 4-8 weeks pregnant)   Negative: Chest pain   Negative: Headache is main symptom   Negative: Vomiting (or Nausea) in a cancer patient who is currently (or recently) receiving chemotherapy or radiation therapy, or cancer patient who has metastatic or end-stage cancer and is receiving palliative care   Negative: [1] Vomiting AND [2] contains red blood or black (\"coffee ground\") material  (Exception: Few red streaks in vomit that only happened once.)   Negative: Severe pain in one eye   Negative: Recent head injury (within last 3 days)   Negative: Recent abdominal injury (within last 3 days)   Negative: [1] Insulin-dependent diabetes (Type I) AND [2] glucose > 400 mg/dl (22 mmol/l)   Negative: [1] Vomiting AND [2] hernia is more painful or swollen than usual    Answer Assessment - Initial Assessment Questions  1. VOMITING SEVERITY: \"How many times have you vomited in the past 24 hours?\"      - MILD:  1 - 2 times/day     - MODERATE: 3 - 5 times/day, decreased oral intake without significant weight loss or symptoms of dehydration     - SEVERE: 6 or more times/day, vomits everything or nearly everything, with significant weight loss, symptoms of dehydration       severe  2. ONSET: \"When did the vomiting begin?\"       today  3. FLUIDS: \"What fluids or food have you vomited up today?\" \"Have " "you been able to keep any fluids down?\"      Unable to keep anything down today  4. ABDOMEN PAIN: \"Are your having any abdomen pain?\" If Yes : \"How bad is it and what does it feel like?\" (e.g., crampy, dull, intermittent, constant)       No abdominal pain; c/o back/flank pain  5. DIARRHEA: \"Is there any diarrhea?\" If Yes, ask: \"How many times today?\"       denies  6. CONTACTS: \"Is there anyone else in the family with the same symptoms?\"       unknown  7. CAUSE: \"What do you think is causing your vomiting?\"      Kidney stone  8. HYDRATION STATUS: \"Any signs of dehydration?\" (e.g., dry mouth [not only dry lips], too weak to stand) \"When did you last urinate?\"      Can only urinate small amounts  9. OTHER SYMPTOMS: \"Do you have any other symptoms?\" (e.g., fever, headache, vertigo, vomiting blood or coffee grounds, recent head injury)      Fever, chills, back pain, flank pain  10. PREGNANCY: \"Is there any chance you are pregnant?\" \"When was your last menstrual period?\"        na    Protocols used: Vomiting-ADULT-AH    "

## 2025-05-15 NOTE — PROGRESS NOTES
[facility] Medicine Services  ADMISSION FOLLOW-UP NOTE          Patient admitted after midnight, H&P by my partner performed earlier on today's date reviewed.  Interim findings, labs, and charting also reviewed.        The [facility] Hospital Problem List has been managed and updated to include any new diagnoses:  Active Hospital Problems    Diagnosis  POA    **Right nephrolithiasis [N20.0]  Yes    Primary hypertension [I10]  Yes    Mixed hyperlipidemia [E78.2]  Yes    Pulmonary nodules [R91.8]  Yes    EFRA (acute kidney injury) [N17.9]  Yes      Resolved Hospital Problems   No resolved problems to display.         ADDITIONAL PLAN:  - detailed assessment and plan from admission reviewed  - Patient seen and examined. Pain controlled currently  - To OR later today with Dr Dasilva for cystoscopy, right ureteroscopy, laser lithotripsy, basket stone extraction, and stent placement.   -NPO  -Flomax, strain urine  -IVF  -Rocephin  -Monitor Cr     Expected Discharge   Expected Discharge Date: 5/16/2025; Expected Discharge Time:      Stacie Lucero DO  05/15/25

## 2025-05-15 NOTE — ED NOTES
Thierry Garduno    Nursing Report ED to Floor:  Mental status: A & O x 4   Ambulatory status: Stand By  Oxygen Therapy:  2 L NC  Cardiac Rhythm: NSR  Admitted from: ED/ Home  Safety Concerns:  None  Precautions: None  Social Issues: None  ED Room #:  11    ED Nurse Phone Extension - [phone] or may call [phone].      HPI:   Chief Complaint   Patient presents with    Flank Pain       Past Medical History:  History reviewed. No pertinent past medical history.     Past Surgical History:  No past surgical history on file.     Admitting Doctor:   Serge Hale DO    Consulting Provider(s):  Consults       Date and Time Order Name Status Description    5/15/2025  1:38 AM Inpatient Urology Consult               Admitting Diagnosis:   There were no encounter diagnoses.    Most Recent Vitals:   Vitals:    05/15/25 0030 05/15/25 0100 05/15/25 0130 05/15/25 0200   BP: 141/98 110/81 132/89 128/84   Pulse: 60 84 70 75   Resp:       Temp:       TempSrc:       SpO2: 95% 97% 94% 94%   Weight:       Height:           Active LDAs/IV Access:   Lines, Drains & Airways       Active LDAs       Name Placement date Placement time Site Days    Peripheral IV 05/14/25 2335 18 G Left Antecubital 05/14/25 2335  Antecubital  less than 1                    Labs (abnormal labs have a star):   Labs Reviewed   COMPREHENSIVE METABOLIC PANEL - Abnormal; Notable for the following components:       Result Value    Glucose 124 (*)     Creatinine 1.61 (*)     eGFR 49.9 (*)     All other components within normal limits    Narrative:     GFR Categories in Chronic Kidney Disease (CKD)              GFR Category          GFR (mL/min/1.73)    Interpretation  G1                    90 or greater        Normal or high (1)  G2                    60-89                Mild decrease (1)  G3a                   45-59                Mild to moderate decrease  G3b                   30-44                Moderate to severe decrease  G4                    15-29                 Severe decrease  G5                    14 or less           Kidney failure    (1)In the absence of evidence of kidney disease, neither GFR category G1 or G2 fulfill the criteria for CKD.    eGFR calculation 2021 CKD-EPI creatinine equation, which does not include race as a factor   URINALYSIS W/ MICROSCOPIC IF INDICATED (NO CULTURE) - Abnormal; Notable for the following components:    Ketones, UA Trace (*)     Blood, UA Large (3+) (*)     Protein, UA Trace (*)     All other components within normal limits   CBC WITH AUTO DIFFERENTIAL - Abnormal; Notable for the following components:    WBC 10.81 (*)     Neutrophil % 86.3 (*)     Lymphocyte % 5.6 (*)     Immature Grans % 0.6 (*)     Neutrophils, Absolute 9.35 (*)     Lymphocytes, Absolute 0.60 (*)     Immature Grans, Absolute 0.06 (*)     All other components within normal limits   URINALYSIS, MICROSCOPIC ONLY - Abnormal; Notable for the following components:    RBC, UA Too Numerous to Count (*)     WBC, UA 3-5 (*)     All other components within normal limits   LIPASE - Normal   LACTIC ACID, PLASMA - Normal   RAINBOW DRAW    Narrative:     The following orders were created for panel order [place] Draw.  Procedure                               Abnormality         Status                     ---------                               -----------         ------                     Green Top (Gel)[[id number]]                                  Final result               Lavender Top[[id number]]                                     Final result               Gold Top - SST[[id number]]                                   Final result               Vazquez Top[[id number]]                                         Final result               Light Blue Top[[id number]]                                   Final result                 Please view results for these tests on the individual orders.   CBC WITH AUTO DIFFERENTIAL   MAGNESIUM   PHOSPHORUS   BASIC METABOLIC PANEL   CBC AND DIFFERENTIAL     Narrative:     The following orders were created for panel order CBC & Differential.  Procedure                               Abnormality         Status                     ---------                               -----------         ------                     CBC Auto Differential[[id number]]        Abnormal            Final result                 Please view results for these tests on the individual orders.   GREEN TOP   LAVENDER TOP   GOLD TOP - SST   GRAY TOP   LIGHT BLUE TOP       Meds Given in ED:   Medications   Sodium Chloride (PF) 0.9 % 10 mL (has no administration in time range)   sodium chloride 0.9 % flush 10 mL (has no administration in time range)   sodium chloride 0.9 % flush 10 mL (has no administration in time range)   sodium chloride 0.9 % flush 10 mL (has no administration in time range)   sodium chloride 0.9 % infusion 40 mL (has no administration in time range)   acetaminophen (TYLENOL) tablet 650 mg (has no administration in time range)   aluminum-magnesium hydroxide-simethicone (MAALOX MAX) 400-400-40 MG/5ML suspension 15 mL (has no administration in time range)   sennosides-docusate (PERICOLACE) 8.6-50 MG per tablet 2 tablet (has no administration in time range)     And   polyethylene glycol (MIRALAX) packet 17 g (has no administration in time range)     And   bisacodyl (DULCOLAX) EC tablet 5 mg (has no administration in time range)     And   bisacodyl (DULCOLAX) suppository 10 mg (has no administration in time range)   Potassium Replacement - Follow Nurse / BPA Driven Protocol (has no administration in time range)   Magnesium Standard Dose Replacement - Follow Nurse / BPA Driven Protocol (has no administration in time range)   Phosphorus Replacement - Follow Nurse / BPA Driven Protocol (has no administration in time range)   Calcium Replacement - Follow Nurse / BPA Driven Protocol (has no administration in time range)   ondansetron (ZOFRAN) injection 4 mg (has no administration in time range)    nitroglycerin (NITROSTAT) SL tablet 0.4 mg (has no administration in time range)   lactated ringers infusion (has no administration in time range)   melatonin tablet 5 mg (has no administration in time range)   HYDROmorphone (DILAUDID) injection 0.5 mg (has no administration in time range)   HYDROmorphone (DILAUDID) injection 0.25 mg (has no administration in time range)   ketorolac (TORADOL) injection 15 mg (has no administration in time range)   sodium chloride 0.9 % bolus 1,000 mL (0 mL Intravenous Stopped 5/15/25 0130)   Morphine sulfate (PF) injection 4 mg (4 mg Intravenous Given 5/14/25 2341)   ondansetron (ZOFRAN) injection 4 mg (4 mg Intravenous Given 5/14/25 2341)   tamsulosin (FLOMAX) 24 hr capsule 0.4 mg (0.4 mg Oral Given 5/14/25 2341)   HYDROmorphone (DILAUDID) injection 0.5 mg (0.5 mg Intravenous Given 5/15/25 0128)     lactated ringers, 125 mL/hr         Last NIH score:                                                          Dysphagia screening results:  Patient Factors Component (Dysphagia:Stroke or Rule-out)  Best Eye Response: 4-->(E4) spontaneous (05/15/25 0000)  Best Motor Response: 6-->(M6) obeys commands (05/15/25 0000)  Best Verbal Response: 5-->(V5) oriented (05/15/25 0000)  [place] Coma Scale Score: 15 (05/15/25 0000)     Lauren Coma Scale:  No data recorded     CIWA:        Restraint Type:            Isolation Status:  No active isolations

## 2025-05-15 NOTE — OP NOTE
Operative Note    Name: Thierry Garduno   Age:54 y.o.   Sex: male  MRN: [id number]    DATE OF PROCEDURE: 5/15/2025    PREOPERATIVE DIAGNOSIS:   Right ureteral stone with hydronephrosis    POSTOPERATIVE DIAGNOSIS: Right ureteral stone with hydronephrosis    PROCEDURE PERFORMED:  1.  Cystoscopy, right ureteroscopy, laser lithotripsy, basket stone extraction, ureteral stent placement  2.  Fluoroscopy, supervision and interpretation    SURGEON: Nicko Dasilva MD    ASSISTANT: None    ANESTHESIA: General    SPECIMEN: Right ureteral stone fragments for analysis    FINDINGS: Anterior urethra normal.  Prostatic urethra with mild lateral lobes.  Bladder normal.  Right ureteroscopy revealed the stone in the distal ureter which was fragmented and extracted.  There was proper placement of the right stent.    INDICATIONS FOR PROCEDURE: 54-year-old male with his first stone episode.  He presented with right renal colic and intractable nausea and vomiting.  CT scan showed a 4 mm right distal ureteral stone with hydronephrosis.  He had EFRA with creatinine 1.61.  He was admitted for pain control.  He is here for right ureteroscopic laser lithotripsy.    DRAINS: Right ureteral stent 6 Romansh by 24 cm, strings intact    DESCRIPTION OF PROCEDURE: After informed consent, the patient was taken to the operating room in stable condition.  Anesthesia was induced without complications.  He was placed in a lithotomy position.  The genitalia and groins were prepped and draped.  A timeout was taken to identify the correct patient and procedures and side.  A 22 Romansh cystoscope was placed per urethra into the bladder.  Inspection revealed the above-noted findings.  A sensor guidewire was inserted into the right ureter and advanced up to the level of the kidney under fluoroscopy guidance.  There was mild resistance advancing the wire beyond the stone.  The cystoscope was removed.  Right ureteroscopy was performed with a semirigid  ureteroscope and another guidewire for guidance.  The stone was identified in the distal ureter and pushed back to allow more room.  A 200 µm thulium laser was used to fragment the stone into several pieces.  A 1.9 Moldovan 0 tip basket was used to extract the fragments into the bladder.  Ureteroscopy into the middle and upper ureter showed no other stones.  The ureteroscope was withdrawn and removed.  The cystoscope was placed over the wire into the bladder.  A 6 Moldovan by 24 cm double-J ureteral stent was passed over the wire and up to the level of the kidney under fluoroscopy guidance without difficulty.  The wire was removed and there was good curl in the kidney and in the bladder.  The strings were left intact.  The bladder was flushed of stone fragments.  The bladder was drained.  The cystoscope was removed.  The strings were secured to the penis with Tegaderm and Mastisol.  He was awakened from anesthesia and taken recovery in satisfactory condition.    Estimated blood loss: None    Complications: None    DISPOSITION: He will go to the PACU and return to the floor for routine postoperative care.  He will likely be discharged home in the morning if stable.  The stent and strings may be removed in 4 days on Monday morning and he may come to the office if needed.  He may follow-up with me in 3 to 4 weeks.    Nicko Dasilva MD  -  5/15/2025, 19:40 EDT

## 2025-05-15 NOTE — ANESTHESIA PROCEDURE NOTES
Airway  Reason: elective    Date/Time: 5/15/2025 6:53 PM  Airway not difficult    General Information and Staff    Patient location during procedure: OR    Indications and Patient Condition  Indications for airway management: airway protection    Preoxygenated: yes    Mask difficulty assessment: 1 - vent by mask    Final Airway Details    Final airway type: supraglottic airway      Successful airway: I-gel  Size: 4   Number of attempts at approach: 1  Assessment: lips, teeth, and gum same as pre-op    Additional Comments  LMA placed without difficulty, ventilation with assist, equal breath sounds and symmetric chest rise and fall

## 2025-05-15 NOTE — ANESTHESIA POSTPROCEDURE EVALUATION
Patient: Thierry Garduno    Procedure Summary       Date: 05/15/25 Room / Location:  [facility] 07 /  [facility]    Anesthesia Start: 1847 Anesthesia Stop: 1935    Procedure: CYSTOSCOPY, URETEROSCOPY, LASER, AND STENT INSERTION (Right: Ureter) Diagnosis:     Surgeons: Nicko Dasilva MD Provider: Nicko Vásquze MD    Anesthesia Type: general ASA Status: 2            Anesthesia Type: general    Vitals  No vitals data found for the desired time range.          Post Anesthesia Care and Evaluation    Patient location during evaluation: PACU  Patient participation: complete - patient participated  Level of consciousness: awake and alert  Pain management: adequate    Airway patency: patent  Anesthetic complications: No anesthetic complications  PONV Status: none  Cardiovascular status: hemodynamically stable and acceptable  Respiratory status: nonlabored ventilation, acceptable and nasal cannula  Hydration status: acceptable

## 2025-05-15 NOTE — CASE MANAGEMENT/SOCIAL WORK
Discharge Planning Assessment  [facility]     Patient Name: Thierry Garduno  MRN: [id number]  Today's Date: 5/15/2025    Admit Date: 5/14/2025    Plan: home   Discharge Needs Assessment       Row Name 05/15/25 0815       Living Environment    People in Home significant other    Primary Care Provided by self       Transition Planning    Patient/Family Anticipates Transition to home with family       Discharge Needs Assessment    Readmission Within the Last 30 Days no previous admission in last 30 days    Equipment Currently Used at Home none    Concerns to be Addressed basic needs;discharge planning                   Discharge Plan       Row Name 05/15/25 0816       Plan    Plan home    Patient/Family in Agreement with Plan yes    Plan Comments I met with this patient bedside. He lives with his SO in [facility]. He is independent with activities of daily living and mobility. He anticipates returning home after this hospitalization, and his SO can transport. Case mamgement will follow.    Final Discharge Disposition Code 01 - home or self-care                       Demographic Summary       Row Name 05/15/25 0815       General Information    General Information Comments I confirmed that Slade Juares is Mr Garduno's PCP and he has [employer]                   Functional Status       Row Name 05/15/25 0815       Functional Status, IADL    Medications independent    Meal Preparation independent    Housekeeping independent    Laundry independent    Shopping independent                   Psychosocial    No documentation.                  Abuse/Neglect    No documentation.                  Legal    No documentation.                  Substance Abuse    No documentation.                  Patient Forms    No documentation.                     Sana Pizano RN

## 2025-05-15 NOTE — PLAN OF CARE
Goal Outcome Evaluation:           Patient arrived to unit from ED A+Ox4 without s/s of distress or discomfort.   Patient reports R sided flank pain is 2/10, states he is comfortable with pain level of 4 or below. Patient denies chest pain, SOB, nausea, dysuria.   No needs or complaints overnight.   VSS on 2L tele reading NSR  Order for NPO, strict I+Os and straining urine acknowledged and followed      Problem: Adult Inpatient Plan of Care  Goal: Optimal Comfort and Wellbeing  Outcome: Progressing  Intervention: Monitor Pain and Promote Comfort  Recent Flowsheet Documentation  Taken 5/15/2025 0317 by Andra Valle, RN  Pain Management Interventions: pain management plan reviewed with patient/caregiver  Intervention: Provide Person-Centered Care  Recent Flowsheet Documentation  Taken 5/15/2025 0317 by Andra Valle, RN  Trust Relationship/Rapport:   care explained   choices provided   emotional support provided   empathic listening provided   questions answered   questions encouraged   reassurance provided   thoughts/feelings acknowledged     Problem: Comorbidity Management  Goal: Blood Pressure in Desired Range  Outcome: Progressing     Problem: Nausea and Vomiting  Goal: Nausea and Vomiting Relief  Outcome: Progressing

## 2025-05-15 NOTE — H&P
[facility] Medicine Services  HISTORY AND PHYSICAL    Patient Name: Thierry Garduno  : 1970  MRN: [id number]  Primary Care Physician: Slade Juares MD  Date of admission: 2025  Subjective   Subjective   Source of Information: Patient, ER signout, EMR    Chief Complaint:   Chief Complaint   Patient presents with    Flank Pain     HPI:  Thierry Garduno is a 54 y.o. male with PMHx of Nephrolithiasis, HTN, HLD, GERD, Pulmonary nodules who presented for evaluation of flank pain and intractable N/V. Yesterday afternoon, the patient developed right sided flank pain, radiating to groin, colicky, constant, severe that was associated with N/V which progressed to continued wretching. Patient initially presented to the ER and was diagnosed with a right 4mm nonobstructive renal stone, but after improvement in his condition, was discharged home with pain medications and urology follow-up as needed. The patient returned to the ER due to inability to tolerate PO and intractable pain. He was found to have worsening renal function, as well. On-call Urology was contacted and is aware. Currently, his pain is 5/10; denied F/C, CP, SOB, vomiting, diarrhea, gross hematuria.    Review of systems:  Total 12 point review of systems is negative except as I have mentioned above    Personal History     No past medical history on file.      No past surgical history on file.  Family History: family history is not on file.     Social History:  reports that he has never smoked. He has never used smokeless tobacco. He reports current alcohol use. He reports that he does not use drugs.  Social History     Social History Narrative    Not on file       Medications:  Available home medication information reviewed.  HYDROcodone-acetaminophen, cyclobenzaprine, losartan, omeprazole, ondansetron, sildenafil, and tamsulosin    No Known Allergies  Objective   Objective     Vital Signs:   Temp:  [98.2 °F (36.8 °C)]  98.2 °F (36.8 °C)  Heart Rate:  [60-84] 70  Resp:  [18] 18  BP: (110-150)/(81-98) 132/89     General:  Well nourished, Appears uncomfortable but NAD  Head:  Normocephalic, atraumatic  Eyes:  Sclerae appear normal. Pupils equally round  ENT:  Nares appear normal, no drainage. Moist oral mucosa  Neck:  No restricted ROM. Trachea midline  CV:  RRR. No M/R/G. No JVD  Lungs: CTAB. No wheezing, rhonchi, or rales. Symmetric expansion  Abdomen: Bowel sounds present. Nondistended, soft, nontender, no CVA TTP  Extremities: No cyanosis or clubbing. No edema.  Skin:  No rashes and normal coloration. Warm and dry.  Neuro:  CN II-XII grossly intact. Sensation intact. AAOx3  Psych:  Normal mood and affect    I have personally reviewed labs and tests showing:    LAB RESULTS:      Lab 05/14/25 2334 05/13/25  1802   WBC 10.81* 9.11   HEMOGLOBIN 13.4 15.0   HEMATOCRIT 40.5 43.6   PLATELETS 232 254   NEUTROS ABS 9.35* 7.79*   IMMATURE GRANS (ABS) 0.06* 0.04   LYMPHS ABS 0.60* 0.54*   MONOS ABS 0.72 0.69   EOS ABS 0.04 0.02   MCV 89.0 86.7   LACTATE 1.5  --          Lab 05/14/25 2334 05/13/25 1807 05/13/25  1802   SODIUM 142  --  143   POTASSIUM 3.9  --  4.0   CHLORIDE 107  --  107   CO2 22.0  --  24.0   ANION GAP 13.0  --  12.0   BUN 19  --  16   CREATININE 1.61* 1.40* 1.25   EGFR 49.9*  --  67.6   GLUCOSE 124*  --  90   CALCIUM 9.2  --  9.4         Lab 05/14/25 2334 05/13/25  1802   TOTAL PROTEIN 6.7 6.7   ALBUMIN 4.2 4.2   GLOBULIN 2.5 2.5   ALT (SGPT) 18 19   AST (SGOT) 25 24   BILIRUBIN 0.5 0.5   ALK PHOS 52 52   LIPASE 17 25                     UA          5/13/2025    16:38 5/13/2025    18:50 5/15/2025    00:39   Urinalysis   Squamous Epithelial Cells, UA  None Seen  0-2    Specific Gravity, UA  1.043  1.024    Ketones, UA Negative  Trace  Trace    Blood, UA  Large (3+)  Large (3+)    Leukocytes, UA Negative  Negative  Negative    Nitrite, UA  Negative  Negative    RBC, UA  Too Numerous to Count  Too Numerous to Count    WBC,  UA  0-2  3-5    Bacteria, UA  None Seen  None Seen      Microbiology Results (last 10 days)       Procedure Component Value - Date/Time    Urine Culture - Urine, Urine, Clean Catch [[id number]]  (Normal) Collected: 05/13/25 1642    Lab Status: Final result Specimen: Urine, Clean Catch Updated: 05/14/25 2046     Urine Culture No growth          CT Abdomen Pelvis Without Contrast  Result Date: 5/15/2025  CT ABDOMEN PELVIS WO CONTRAST Date of Exam: 5/14/2025 11:57 PM EDT Indication: worsening right renal colic, 4-5mm right distal ureteral stone, decrease UOP. Comparison: CT abdomen pelvis 5/13/2025 Technique: Axial CT images were obtained of the abdomen and pelvis without the administration of contrast. Reconstructed coronal and sagittal images were also obtained. Automated exposure control and iterative construction methods were used. Findings: Lung Bases:   There are stable noncalcified pulmonary nodules in the right lower lobe up to 3 mm. Small nodules are seen on the left side also similar in appearance. Findings are most likely postinfectious/inflammatory with a tree-in-bud appearance suggestive of alveolitis. There is a subpleural nodule in the right middle lobe anteriorly along the diaphragm, similar to the prior study. Liver: Liver is normal in size and CT density. No focal lesions. Biliary/Gallbladder:  The gallbladder is normal without evidence of radiopaque stones. The biliary tree is nondilated. Spleen: Spleen is normal in size and CT density. Pancreas:  Pancreas is normal. There is no evidence of pancreatic mass or peripancreatic fluid. Kidneys:  The left kidney remains normal. There is a standing column of excreted contrast from the right kidney down to the level of the distal right ureter where there is a stable 4 mm stone. The perinephric fat stranding. Adrenals:  Adrenal glands are unremarkable. Retroperitoneal/Lymph Nodes/Vasculature:  No retroperitoneal adenopathy is identified.  Gastrointestinal/Mesentery:  There is mild thickening of the distal esophageal wall. The bowel loops are non-dilated without wall thickening or mass. The appendix appears within normal limits. No evidence of obstruction. No free air. No mesenteric fluid collections identified. Bladder:  The bladder is normal. Genital:   Unremarkable       Bony Structures:   Visualized bony structures are consistent with the patient's age.     Impression: Impression: 1.Stable 4 mm distal right ureteral stone with a standing column of excreted contrast from the right kidney. There is no significant hydronephrosis. 2.Stable pulmonary nodules in the lung bases felt to be alveolitis. Electronically Signed: Sandeep Camp MD  5/15/2025 1:02 AM EDT  Workstation ID: [id number]    CT Abdomen Pelvis With Contrast  Result Date: 5/13/2025  CT ABDOMEN PELVIS W CONTRAST Date of Exam: 5/13/2025 6:18 PM EDT Indication: R flank pain. Comparison: None available. Technique: Axial CT images were obtained of the abdomen and pelvis following the uneventful intravenous administration of intravenous Isovue. Reconstructed coronal and sagittal images were also obtained. Automated exposure control and iterative construction methods were used. FINDINGS: Abdomen/Pelvis: Lower Chest: Motion limited imaging of the lung bases demonstrates a cluster of small noncalcified nodules on the right measuring up to 3 mm. Small nodules also noted on the left is similar in size. These findings are favored to be postinflammatory or infectious in nature. Additionally there is a 5 mm subpleural nodule right middle lobe anteriorly. There is no free air noted below the diaphragm. Organs: Lobular contour of the right kidney is noted with increased perinephric stranding. Kidney enhances symmetrically. There is mild to moderate hydroureteronephrosis with mild stranding along the ureter secondary to a distal 4 to 5 mm calculus. There  is some perinephric stranding of the left kidney  without evidence of hydronephrosis or obstructive uropathy. Kidney enhances symmetrically small subcentimeter low-attenuation lesions within the liver persist on the delayed imaging compatible with small cyst. Liver is otherwise grossly unremarkable in appearance. The gallbladder, pancreas, spleen and adrenal glands appear grossly unremarkable GI/Bowel: There is mild prominence of the wall thickness of the esophagus. Mild degree of underlying esophagitis not excluded. The stomach is distended without acute abnormality appreciated. There is no evidence of obstruction. The small bowel demonstrates no definite acute abnormality. Ileocecal valve and appendix appear unremarkable. Diverticulosis without evidence of acute inflammatory change noted of the colon. No suspicious mesenteric adenopathy or fluid collection. Pelvis: Bladder is decompressed with diffuse wall thickening. There is some perivesicular stranding noted. Underlying cystitis not excluded. Fat-containing left inguinal hernia noted without bowel involvement. Small lymph nodes are noted within the pelvis. Prostate is mildly enlarged Peritoneum/Retroperitoneum: The aorta is normal in caliber. There is mild stranding within the retroperitoneum and small lymph nodes which are likely reactive Bones/Soft Tissues: Multilevel degenerative changes are noted of the spine. No destructive bone lesion.     Impression: 1.Mild to moderate right-sided hydroureteronephrosis secondary to a 4 to 5 mm calculus within the distal ureter. 2.Diffuse wall thickening of the urinary bladder with perivesicular stranding. Findings may be related to underlying cystitis. Please correlate with urinalysis. 3.Diverticulosis without evidence of diverticulitis. 4.Mild prominence of the wall thickness of the esophagus. Findings are nonspecific but can be seen with underlying esophagitis. 5.Noncalcified nodules within the lung bases. Findings are favored to be postinflammatory or infectious in  "nature. Recommend follow-up in 6 to 12 months 6.Other findings as above. Electronically Signed: Marcel Rodarte MD  5/13/2025 7:15 PM EDT  Workstation ID: [id number]    This case was discussed with the ER attending physician.    Assessment & Plan   Assessment & Plan       Right nephrolithiasis    Primary hypertension    Mixed hyperlipidemia    Pulmonary nodules    EFRA (acute kidney injury)    Right nephrolithiasis  - Afebrile, WBC 10.8  - CT A/P revealed \"Stable 4 mm distal right ureteral stone with a standing column of excreted contrast from the right kidney. There is no significant hydronephrosis.\"  - NPO, IVF, flomax, strain urine, pain control PRN  - Urology consulted from ER and recommended against Abx; to see in AM    EFRA  - BUN/Cr. 19/1.61 from 1.25  - IVF, avoid nephrotoxins, monitor renal function    HTN  - Chronic, normotensive   - Hold losartan and resume when appropriate    HLD  - Chronic  - Resume statin when appropriate    Pulmonary nodules  - Chronic  - CT A/P revealed \"Stable pulmonary nodules in the lung bases felt to be alveolitis.\"; also noted on CT imaging back in 2023, stable.  - F/U outpatient      VTE PPx: SCDs  Diet: NPO Diet NPO Type: Strict NPO  CODE STATUS:    Code Status and Medical Interventions: CPR (Attempt to Resuscitate); Full Support   Ordered at: 05/15/25 0138     Code Status (Patient has no pulse and is not breathing):    CPR (Attempt to Resuscitate)     Medical Interventions (Patient has pulse or is breathing):    Full Support     Level Of Support Discussed With:    Patient       Expected Discharge TBD  Expected discharge date/ time has not been documented.     Serge Hale,   05/15/25    "

## 2025-05-15 NOTE — PLAN OF CARE
Goal Outcome Evaluation:  Plan of Care Reviewed With: patient   - VSS, RA, A&Ox4  - PRNs given for pain. No complaints of nausea  - IV abx administered  - No other complaints at this time     Progress: no change          Problem: Adult Inpatient Plan of Care  Goal: Plan of Care Review  Outcome: Progressing  Flowsheets (Taken 5/15/2025 1530)  Progress: no change  Plan of Care Reviewed With: patient  Goal: Patient-Specific Goal (Individualized)  Outcome: Progressing  Goal: Absence of Hospital-Acquired Illness or Injury  Outcome: Progressing  Intervention: Identify and Manage Fall Risk  Recent Flowsheet Documentation  Taken 5/15/2025 1400 by Nasreen Rao RN  Safety Promotion/Fall Prevention:   activity supervised   assistive device/personal items within reach   clutter free environment maintained   fall prevention program maintained   mobility aid in reach   lighting adjusted   nonskid shoes/slippers when out of bed   room organization consistent   safety round/check completed  Taken 5/15/2025 1236 by Nasreen Rao, RN  Safety Promotion/Fall Prevention:   activity supervised   assistive device/personal items within reach   clutter free environment maintained   fall prevention program maintained   lighting adjusted   mobility aid in reach   nonskid shoes/slippers when out of bed   room organization consistent   safety round/check completed  Taken 5/15/2025 1000 by Nasreen Rao, RN  Safety Promotion/Fall Prevention:   activity supervised   assistive device/personal items within reach   clutter free environment maintained   fall prevention program maintained   lighting adjusted   mobility aid in reach   nonskid shoes/slippers when out of bed   room organization consistent   safety round/check completed  Taken 5/15/2025 0800 by Nasreen Rao, RN  Safety Promotion/Fall Prevention:   activity supervised   assistive device/personal items within reach   clutter free environment maintained   fall prevention program  maintained   lighting adjusted   mobility aid in reach   nonskid shoes/slippers when out of bed   room organization consistent   safety round/check completed  Intervention: Prevent Skin Injury  Recent Flowsheet Documentation  Taken 5/15/2025 1400 by Nasreen Rao RN  Body Position: position changed independently  Taken 5/15/2025 1236 by Nasreen Rao RN  Body Position: position changed independently  Taken 5/15/2025 1000 by Nasreen Rao RN  Body Position: position changed independently  Taken 5/15/2025 0800 by Nasreen Rao RN  Body Position: position changed independently  Intervention: Prevent Infection  Recent Flowsheet Documentation  Taken 5/15/2025 1400 by Nasreen Rao RN  Infection Prevention: environmental surveillance performed  Taken 5/15/2025 1236 by Nasreen Rao RN  Infection Prevention: environmental surveillance performed  Taken 5/15/2025 1000 by Nasreen Rao RN  Infection Prevention: environmental surveillance performed  Taken 5/15/2025 0800 by Nasreen Rao RN  Infection Prevention: environmental surveillance performed  Goal: Optimal Comfort and Wellbeing  Outcome: Progressing  Intervention: Monitor Pain and Promote Comfort  Recent Flowsheet Documentation  Taken 5/15/2025 1236 by Nasreen Rao RN  Pain Management Interventions: pain medication given  Intervention: Provide Person-Centered Care  Recent Flowsheet Documentation  Taken 5/15/2025 0800 by Nasreen Rao RN  Trust Relationship/Rapport:   care explained   questions answered   thoughts/feelings acknowledged  Goal: Readiness for Transition of Care  Outcome: Progressing     Problem: Comorbidity Management  Goal: Blood Pressure in Desired Range  Outcome: Progressing  Intervention: Maintain Blood Pressure Management  Recent Flowsheet Documentation  Taken 5/15/2025 1400 by Nasreen Rao RN  Medication Review/Management: medications reviewed  Taken 5/15/2025 1236 by Nasreen Rao RN  Medication Review/Management:  medications reviewed  Taken 5/15/2025 1000 by Nasreen Rao, RN  Medication Review/Management: medications reviewed  Taken 5/15/2025 0800 by Nasreen Rao, RN  Medication Review/Management: medications reviewed     Problem: Nausea and Vomiting  Goal: Nausea and Vomiting Relief  Outcome: Progressing

## 2025-05-16 VITALS
HEIGHT: 71 IN | DIASTOLIC BLOOD PRESSURE: 76 MMHG | SYSTOLIC BLOOD PRESSURE: 122 MMHG | TEMPERATURE: 98.3 F | HEART RATE: 91 BPM | OXYGEN SATURATION: 90 % | WEIGHT: 232 LBS | RESPIRATION RATE: 16 BRPM | BODY MASS INDEX: 32.48 KG/M2

## 2025-05-16 PROBLEM — N17.9 AKI (ACUTE KIDNEY INJURY): Status: RESOLVED | Noted: 2025-05-15 | Resolved: 2025-05-16

## 2025-05-16 LAB
ANION GAP SERPL CALCULATED.3IONS-SCNC: 10 MMOL/L (ref 5–15)
BUN SERPL-MCNC: 19 MG/DL (ref 6–20)
BUN/CREAT SERPL: 15 (ref 7–25)
CALCIUM SPEC-SCNC: 8.4 MG/DL (ref 8.6–10.5)
CHLORIDE SERPL-SCNC: 106 MMOL/L (ref 98–107)
CO2 SERPL-SCNC: 23 MMOL/L (ref 22–29)
CREAT SERPL-MCNC: 1.27 MG/DL (ref 0.76–1.27)
DEPRECATED RDW RBC AUTO: 43.7 FL (ref 37–54)
EGFRCR SERPLBLD CKD-EPI 2021: 66.3 ML/MIN/1.73
ERYTHROCYTE [DISTWIDTH] IN BLOOD BY AUTOMATED COUNT: 13.2 % (ref 12.3–15.4)
GLUCOSE SERPL-MCNC: 115 MG/DL (ref 65–99)
HCT VFR BLD AUTO: 36.6 % (ref 37.5–51)
HGB BLD-MCNC: 12.1 G/DL (ref 13–17.7)
MCH RBC QN AUTO: 29.7 PG (ref 26.6–33)
MCHC RBC AUTO-ENTMCNC: 33.1 G/DL (ref 31.5–35.7)
MCV RBC AUTO: 89.9 FL (ref 79–97)
PLATELET # BLD AUTO: 189 10*3/MM3 (ref 140–450)
PMV BLD AUTO: 10.7 FL (ref 6–12)
POTASSIUM SERPL-SCNC: 4.6 MMOL/L (ref 3.5–5.2)
RBC # BLD AUTO: 4.07 10*6/MM3 (ref 4.14–5.8)
SODIUM SERPL-SCNC: 139 MMOL/L (ref 136–145)
WBC NRBC COR # BLD AUTO: 7.05 10*3/MM3 (ref 3.4–10.8)

## 2025-05-16 PROCEDURE — G0378 HOSPITAL OBSERVATION PER HR: HCPCS

## 2025-05-16 PROCEDURE — 80048 BASIC METABOLIC PNL TOTAL CA: CPT | Performed by: UROLOGY

## 2025-05-16 PROCEDURE — 85027 COMPLETE CBC AUTOMATED: CPT | Performed by: UROLOGY

## 2025-05-16 RX ORDER — ACETAMINOPHEN 325 MG/1
650 TABLET ORAL EVERY 4 HOURS PRN
Start: 2025-05-16

## 2025-05-16 RX ORDER — HYDROCODONE BITARTRATE AND ACETAMINOPHEN 5; 325 MG/1; MG/1
1 TABLET ORAL EVERY 6 HOURS PRN
Refills: 0 | Status: DISCONTINUED | OUTPATIENT
Start: 2025-05-16 | End: 2025-05-16 | Stop reason: HOSPADM

## 2025-05-16 NOTE — DISCHARGE SUMMARY
[facility] Medicine Services  DISCHARGE SUMMARY    Patient Name: Thierry Garduno  : 1970  MRN: [id number]    Date of Admission: 2025 11:18 PM  Date of Discharge:  2025  Primary Care Physician: Slade Juares MD    Consults       Date and Time Order Name Status Description    5/15/2025  1:38 AM Inpatient Urology Consult Completed             Hospital Course     Presenting Problem: right side kidney stone    Active Hospital Problems    Diagnosis  POA    **Right nephrolithiasis [N20.0]  Yes    Primary hypertension [I10]  Yes    Mixed hyperlipidemia [E78.2]  Yes    Pulmonary nodules [R91.8]  Yes      Resolved Hospital Problems    Diagnosis Date Resolved POA    EFRA (acute kidney injury) [N17.9] 2025 Yes          Hospital Course:  Thierry Garduno is a 54 y.o. male who presented w first episode kidney stone. 4 mm right distal ureteral stone w hydronephrosis, EFRA w Cr 1.6. Required right ureteroscopic laser lithotripsy and stent placement on 5/15  Significant improvement in symptoms post-op and found appropriate for home  D/w Dr Dasilva - no need for ppx abx, f/u in clinic Monday for string removal and f/u in 3 weeks      Discharge Follow Up Recommendations for outpatient labs/diagnostics:   Stent removal Monday in clinic   Dr Dasilva 3-4 weeks    Day of Discharge     HPI:   Doing very well, anxious for home today  Has some pain pills from prior ED visit, needs nothing else      Vital Signs:      Vitals:    25 1300   BP:    Pulse: 91   Resp:    Temp:    SpO2: 90%         Physical Exam:  Constitutional: No acute distress, awake, alert  Respiratory: Clear to auscultation bilaterally, respiratory effort normal   Cardiovascular: RRR, no murmurs, rubs, or gallops  Gastrointestinal: Soft, nontender, nondistended  Musculoskeletal: Muscle tone within normal limits, no joint effusions appreciated  Psychiatric: Appropriate affect, cooperative  Neurologic: Alert and oriented,  facial movements symmetric and spontaneous movement of all 4 extremities grossly equal bilaterally, speech clear  Skin: No rashes    Pertinent  and/or Most Recent Results     LAB RESULTS:      Lab 05/16/25  0412 05/15/25  0951 05/14/25 2334 05/13/25  1802   WBC 7.05 7.75 10.81* 9.11   HEMOGLOBIN 12.1* 12.3* 13.4 15.0   HEMATOCRIT 36.6* 37.8 40.5 43.6   PLATELETS 189 188 232 254   NEUTROS ABS  --  5.41 9.35* 7.79*   IMMATURE GRANS (ABS)  --  0.03 0.06* 0.04   LYMPHS ABS  --  1.20 0.60* 0.54*   MONOS ABS  --  0.88 0.72 0.69   EOS ABS  --  0.17 0.04 0.02   MCV 89.9 91.5 89.0 86.7   LACTATE  --   --  1.5  --          Lab 05/16/25  0412 05/15/25  09 05/14/25 2334 05/13/25 1807 05/13/25  1802   SODIUM 139 140 142  --  143   POTASSIUM 4.6 4.0 3.9  --  4.0   CHLORIDE 106 109* 107  --  107   CO2 23.0 22.0 22.0  --  24.0   ANION GAP 10.0 9.0 13.0  --  12.0   BUN 19 19 19  --  16   CREATININE 1.27 1.45* 1.61* 1.40* 1.25   EGFR 66.3 56.6* 49.9*  --  67.6   GLUCOSE 115* 92 124*  --  90   CALCIUM 8.4* 8.5* 9.2  --  9.4   MAGNESIUM  --  1.8  --   --   --    PHOSPHORUS  --  2.9  --   --   --          Lab 05/14/25 2334 05/13/25  1802   TOTAL PROTEIN 6.7 6.7   ALBUMIN 4.2 4.2   GLOBULIN 2.5 2.5   ALT (SGPT) 18 19   AST (SGOT) 25 24   BILIRUBIN 0.5 0.5   ALK PHOS 52 52   LIPASE 17 25                     Brief Urine Lab Results  (Last result in the past 365 days)        Color   Clarity   Blood   Leuk Est   Nitrite   Protein   CREAT   Urine HCG        05/15/25 0039 Yellow   Clear   Large (3+)   Negative   Negative   Trace                 Microbiology Results (last 10 days)       Procedure Component Value - Date/Time    Urine Culture - Urine, Urine, Clean Catch [[id number]]  (Normal) Collected: 05/13/25 1642    Lab Status: Final result Specimen: Urine, Clean Catch Updated: 05/14/25 2046     Urine Culture No growth            FL C Arm During Surgery  Result Date: 5/15/2025  This procedure was auto-finalized with no dictation  required.    CT Abdomen Pelvis Without Contrast  Result Date: 5/15/2025  CT ABDOMEN PELVIS WO CONTRAST Date of Exam: 5/14/2025 11:57 PM EDT Indication: worsening right renal colic, 4-5mm right distal ureteral stone, decrease UOP. Comparison: CT abdomen pelvis 5/13/2025 Technique: Axial CT images were obtained of the abdomen and pelvis without the administration of contrast. Reconstructed coronal and sagittal images were also obtained. Automated exposure control and iterative construction methods were used. Findings: Lung Bases:   There are stable noncalcified pulmonary nodules in the right lower lobe up to 3 mm. Small nodules are seen on the left side also similar in appearance. Findings are most likely postinfectious/inflammatory with a tree-in-bud appearance suggestive of alveolitis. There is a subpleural nodule in the right middle lobe anteriorly along the diaphragm, similar to the prior study. Liver: Liver is normal in size and CT density. No focal lesions. Biliary/Gallbladder:  The gallbladder is normal without evidence of radiopaque stones. The biliary tree is nondilated. Spleen: Spleen is normal in size and CT density. Pancreas:  Pancreas is normal. There is no evidence of pancreatic mass or peripancreatic fluid. Kidneys:  The left kidney remains normal. There is a standing column of excreted contrast from the right kidney down to the level of the distal right ureter where there is a stable 4 mm stone. The perinephric fat stranding. Adrenals:  Adrenal glands are unremarkable. Retroperitoneal/Lymph Nodes/Vasculature:  No retroperitoneal adenopathy is identified. Gastrointestinal/Mesentery:  There is mild thickening of the distal esophageal wall. The bowel loops are non-dilated without wall thickening or mass. The appendix appears within normal limits. No evidence of obstruction. No free air. No mesenteric fluid collections identified. Bladder:  The bladder is normal. Genital:   Unremarkable       Bony  Structures:   Visualized bony structures are consistent with the patient's age.     Impression: 1.Stable 4 mm distal right ureteral stone with a standing column of excreted contrast from the right kidney. There is no significant hydronephrosis. 2.Stable pulmonary nodules in the lung bases felt to be alveolitis. Electronically Signed: Sandeep Camp MD  5/15/2025 1:02 AM EDT  Workstation ID: [id number]    CT Abdomen Pelvis With Contrast  Result Date: 5/13/2025  CT ABDOMEN PELVIS W CONTRAST Date of Exam: 5/13/2025 6:18 PM EDT Indication: R flank pain. Comparison: None available. Technique: Axial CT images were obtained of the abdomen and pelvis following the uneventful intravenous administration of intravenous Isovue. Reconstructed coronal and sagittal images were also obtained. Automated exposure control and iterative construction methods were used. FINDINGS: Abdomen/Pelvis: Lower Chest: Motion limited imaging of the lung bases demonstrates a cluster of small noncalcified nodules on the right measuring up to 3 mm. Small nodules also noted on the left is similar in size. These findings are favored to be postinflammatory or infectious in nature. Additionally there is a 5 mm subpleural nodule right middle lobe anteriorly. There is no free air noted below the diaphragm. Organs: Lobular contour of the right kidney is noted with increased perinephric stranding. Kidney enhances symmetrically. There is mild to moderate hydroureteronephrosis with mild stranding along the ureter secondary to a distal 4 to 5 mm calculus. There  is some perinephric stranding of the left kidney without evidence of hydronephrosis or obstructive uropathy. Kidney enhances symmetrically small subcentimeter low-attenuation lesions within the liver persist on the delayed imaging compatible with small cyst. Liver is otherwise grossly unremarkable in appearance. The gallbladder, pancreas, spleen and adrenal glands appear grossly unremarkable GI/Bowel: There  is mild prominence of the wall thickness of the esophagus. Mild degree of underlying esophagitis not excluded. The stomach is distended without acute abnormality appreciated. There is no evidence of obstruction. The small bowel demonstrates no definite acute abnormality. Ileocecal valve and appendix appear unremarkable. Diverticulosis without evidence of acute inflammatory change noted of the colon. No suspicious mesenteric adenopathy or fluid collection. Pelvis: Bladder is decompressed with diffuse wall thickening. There is some perivesicular stranding noted. Underlying cystitis not excluded. Fat-containing left inguinal hernia noted without bowel involvement. Small lymph nodes are noted within the pelvis. Prostate is mildly enlarged Peritoneum/Retroperitoneum: The aorta is normal in caliber. There is mild stranding within the retroperitoneum and small lymph nodes which are likely reactive Bones/Soft Tissues: Multilevel degenerative changes are noted of the spine. No destructive bone lesion.     1.Mild to moderate right-sided hydroureteronephrosis secondary to a 4 to 5 mm calculus within the distal ureter. 2.Diffuse wall thickening of the urinary bladder with perivesicular stranding. Findings may be related to underlying cystitis. Please correlate with urinalysis. 3.Diverticulosis without evidence of diverticulitis. 4.Mild prominence of the wall thickness of the esophagus. Findings are nonspecific but can be seen with underlying esophagitis. 5.Noncalcified nodules within the lung bases. Findings are favored to be postinflammatory or infectious in nature. Recommend follow-up in 6 to 12 months 6.Other findings as above. Electronically Signed: Marcel Rodarte MD  5/13/2025 7:15 PM EDT  Workstation ID: [id number]                  Plan for Follow-up of Pending Labs/Results: per urology  Pending Labs       Order Current Status    STONE ANALYSIS - Calculus, Ureter, Right In process          Discharge Details         Discharge Medications        New Medications        Instructions Start Date   acetaminophen 325 MG tablet  Commonly known as: TYLENOL   650 mg, Oral, Every 4 Hours PRN             Continue These Medications        Instructions Start Date   cyclobenzaprine 5 MG tablet  Commonly known as: FLEXERIL   TAKE 2 TABLETS BY MOUTH THREE TIMES DAILY. MAY CAUSE DROWSINESS      HYDROcodone-acetaminophen 5-325 MG per tablet  Commonly known as: NORCO   1 tablet, Oral, Every 6 Hours PRN      losartan 100 MG tablet  Commonly known as: COZAAR   1 tablet, Daily      omeprazole 40 MG capsule  Commonly known as: priLOSEC       ondansetron 4 MG tablet  Commonly known as: ZOFRAN   4 mg, Oral, Every 8 Hours PRN      rosuvastatin 20 MG tablet  Commonly known as: CRESTOR   20 mg, Daily      sildenafil 100 MG tablet  Commonly known as: VIAGRA   TAKE 1 TABLET BY MOUTH AS NEEDED FOR SEXUAL ACTIVITY             Stop These Medications      tamsulosin 0.4 MG capsule 24 hr capsule  Commonly known as: FLOMAX              No Known Allergies      Discharge Disposition:  Home or Self Care    Diet:  Hospital:No active diet order           Activity:      Restrictions or Other Recommendations:         CODE STATUS:    Code Status and Medical Interventions: CPR (Attempt to Resuscitate); Full Support   Ordered at: 05/15/25 0138     Code Status (Patient has no pulse and is not breathing):    CPR (Attempt to Resuscitate)     Medical Interventions (Patient has pulse or is breathing):    Full Support     Level Of Support Discussed With:    Patient       No future appointments.    Additional Instructions for the Follow-ups that You Need to Schedule       Discharge Follow-up with Specified Provider: Monday @ Dr Dasilva office for stent removal, then follow-up in 3-4 weeks w Dr Dasilva   As directed      To: Monday @ Dr Dasilva office for stent removal, then follow-up in 3-4 weeks w Dr Brown Macdonald MD  05/17/25      Time Spent on  Discharge:  I spent  35  minutes on this discharge activity which included: face-to-face encounter with the patient, reviewing the data in the system, coordination of the care with the nursing staff as well as consultants, documentation, and entering orders.

## 2025-05-16 NOTE — PROGRESS NOTES
"Urology    Patient Name: Thierry Garduno  Medical Record Number: [id number]  YOB: 1970     LOS: 0 days   Patient Care Team:  Slade Juares MD as PCP - General (General Practice)    Chief Complaint:    Chief Complaint   Patient presents with    Flank Pain       Subjective     Interval History:     He feels much better after stone extraction yesterday.  He has some urgency and mild dysuria with the stent.  His wife is at the bedside.    Review of Systems:    The following systems were reviewed and negative;  constitution, respiratory, cardiovascular, and gastrointestinal    Objective     Vital Signs  /76 (BP Location: Right arm, Patient Position: Lying)   Pulse 91   Temp 98.3 °F (36.8 °C) (Oral)   Resp 16   Ht 180.3 cm (71\")   Wt 105 kg (232 lb)   SpO2 90%   BMI 32.36 kg/m²   I/O last 3 completed shifts:  In: 1318.3 [I.V.:318.3; IV Piggyback:1000]  Out: 1500 [Urine:1500]  No intake/output data recorded.      Physical Exam:  General Appearance: No acute distress, sitting up in bed, pleasant, appears comfortable  Lungs: Respirations regular, even and  unlabored  Genital: Stent string and Tegaderm on the penis     Results Review:     I reviewed the patient's new clinical results.  Lab Results (last 24 hours)       Procedure Component Value Units Date/Time    STONE ANALYSIS - Calculus, Ureter, Right [[id number]] Collected: 05/15/25 1922    Specimen: Calculus from Ureter, Right Updated: 05/16/25 0628    Basic Metabolic Panel [[id number]]  (Abnormal) Collected: 05/16/25 0412    Specimen: Blood Updated: 05/16/25 0443     Glucose 115 mg/dL      BUN 19 mg/dL      Creatinine 1.27 mg/dL      Sodium 139 mmol/L      Potassium 4.6 mmol/L      Chloride 106 mmol/L      CO2 23.0 mmol/L      Calcium 8.4 mg/dL      BUN/Creatinine Ratio 15.0     Anion Gap 10.0 mmol/L      eGFR 66.3 mL/min/1.73     Narrative:      GFR Categories in Chronic Kidney Disease (CKD)              GFR Category          GFR " (mL/min/1.73)    Interpretation  G1                    90 or greater        Normal or high (1)  G2                    60-89                Mild decrease (1)  G3a                   45-59                Mild to moderate decrease  G3b                   30-44                Moderate to severe decrease  G4                    15-29                Severe decrease  G5                    14 or less           Kidney failure    (1)In the absence of evidence of kidney disease, neither GFR category G1 or G2 fulfill the criteria for CKD.    eGFR calculation 2021 CKD-EPI creatinine equation, which does not include race as a factor    CBC (No Diff) [[id number]]  (Abnormal) Collected: 05/16/25 0412    Specimen: Blood Updated: 05/16/25 0424     WBC 7.05 10*3/mm3      RBC 4.07 10*6/mm3      Hemoglobin 12.1 g/dL      Hematocrit 36.6 %      MCV 89.9 fL      MCH 29.7 pg      MCHC 33.1 g/dL      RDW 13.2 %      RDW-SD 43.7 fl      MPV 10.7 fL      Platelets 189 10*3/mm3             Medication Review:    Current Facility-Administered Medications:     acetaminophen (TYLENOL) tablet 650 mg, 650 mg, Oral, Q4H PRN, Nicko Dasilva MD    aluminum-magnesium hydroxide-simethicone (MAALOX MAX) 400-400-40 MG/5ML suspension 15 mL, 15 mL, Oral, Q6H PRN, Nicko Dasilva MD    sennosides-docusate (PERICOLACE) 8.6-50 MG per tablet 2 tablet, 2 tablet, Oral, BID PRN **AND** polyethylene glycol (MIRALAX) packet 17 g, 17 g, Oral, Daily PRN **AND** bisacodyl (DULCOLAX) EC tablet 5 mg, 5 mg, Oral, Daily PRN **AND** bisacodyl (DULCOLAX) suppository 10 mg, 10 mg, Rectal, Daily PRN, Nicko Dasilva MD    Calcium Replacement - Follow Nurse / BPA Driven Protocol, , Not Applicable, PRN, Nicko Dasilva MD    HYDROcodone-acetaminophen (NORCO) 5-325 MG per tablet 1 tablet, 1 tablet, Oral, Q6H PRN, Hannah Macdonald MD    HYDROmorphone (DILAUDID) injection 0.25 mg, 0.25 mg, Intravenous, Q3H PRN, Nicko Dasilva MD, 0.25 mg at 05/15/25 1236    Magnesium  Standard Dose Replacement - Follow Nurse / BPA Driven Protocol, , Not Applicable, PRN, Nicko Dasilva MD    melatonin tablet 5 mg, 5 mg, Oral, Nightly PRN, Nicko Dasilva MD    nitroglycerin (NITROSTAT) SL tablet 0.4 mg, 0.4 mg, Sublingual, Q5 Min PRN, Nicko Dasilva MD    ondansetron (ZOFRAN) injection 4 mg, 4 mg, Intravenous, Q6H PRN, Nicko Dasilva MD, 4 mg at 05/15/25 1851    Phosphorus Replacement - Follow Nurse / BPA Driven Protocol, , Not Applicable, PRBrown BARBOZA Stephen J., MD    Potassium Replacement - Follow Nurse / BPA Driven Protocol, , Not Applicable, Brown HOUSTON Stephen J., MD    Sodium Chloride (PF) 0.9 % 10 mL, 10 mL, Intravenous, PRNBrown Stephen J., MD    [COMPLETED] Insert Peripheral IV, , , Once **AND** sodium chloride 0.9 % flush 10 mL, 10 mL, Intravenous, PRNBrown Stephen J., MD    sodium chloride 0.9 % flush 10 mL, 10 mL, Intravenous, Q12H, Nicko Dasilva MD, 10 mL at 05/15/25 0253    sodium chloride 0.9 % flush 10 mL, 10 mL, Intravenous, Brown HOUSTON Stephen J., MD    sodium chloride 0.9 % infusion 40 mL, 40 mL, Intravenous, Brown HOUSTON Stephen J., MD    Assessment and Plan    54-year-old male with his first stone episode. He presented with right renal colic and intractable nausea and vomiting. CT scans 5/13/2025 and 5/14/2025 showed a 4 mm right distal ureteral stone with hydronephrosis. He had EFRA with creatinine 1.61. He was admitted for pain control. He underwent right ureteroscopic laser lithotripsy and stent 5/15/2025.    He feels much better after stone extraction yesterday.  He is tolerating the stent reasonably well.  The creatinine improved.  He has good urine output.    Plan: Discharge home.  He was instructed to remove the stent and strings in 3 days on Monday morning and may come to the office if needed.  He may follow-up with me in 3 weeks.      Right nephrolithiasis    Primary hypertension    Mixed hyperlipidemia    Pulmonary  nodules          Plan for disposition:Where: home and When:  today    Nicko Dasilva MD  05/16/25  13:44 EDT

## 2025-05-16 NOTE — CASE MANAGEMENT/SOCIAL WORK
Continued Stay Note  [facility]     Patient Name: Thierry Garduno  MRN: [id number]  Today's Date: 5/16/2025    Admit Date: 5/14/2025    Plan: home   Discharge Plan       Row Name 05/16/25 1207       Plan    Plan home    Patient/Family in Agreement with Plan yes    Plan Comments I met with this patient regarding his discharge home today. He is in agreement, and his SO can transport. He denies having any further discharge planning needs.    Final Discharge Disposition Code 01 - home or self-care                   Discharge Codes    No documentation.                 Expected Discharge Date and Time       Expected Discharge Date Expected Discharge Time    May 16, 2025               Sana Pizano RN     That's fine

## 2025-05-16 NOTE — PLAN OF CARE
Problem: Adult Inpatient Plan of Care  Goal: Plan of Care Review  Outcome: Progressing  Flowsheets (Taken 5/16/2025 0325)  Progress: improving  Outcome Evaluation: - VSS, 2L NC, A&Ox4, Sinus Bradycardia on the monitor. No complaints of pain or nausea. Patient has ambulated around the unit. Urine blood-tinged. Patient resting comfortably. Will continue plan of care.  Plan of Care Reviewed With: patient  Goal: Patient-Specific Goal (Individualized)  Outcome: Progressing  Goal: Absence of Hospital-Acquired Illness or Injury  Outcome: Progressing  Intervention: Identify and Manage Fall Risk  Recent Flowsheet Documentation  Taken 5/16/2025 0200 by Xavier Todd RN  Safety Promotion/Fall Prevention:   activity supervised   assistive device/personal items within reach   clutter free environment maintained   fall prevention program maintained   nonskid shoes/slippers when out of bed   safety round/check completed  Taken 5/16/2025 0000 by Xavier Todd RN  Safety Promotion/Fall Prevention:   assistive device/personal items within reach   activity supervised   clutter free environment maintained   fall prevention program maintained   nonskid shoes/slippers when out of bed   safety round/check completed  Taken 5/15/2025 2200 by Xavier Todd RN  Safety Promotion/Fall Prevention:   activity supervised   assistive device/personal items within reach   clutter free environment maintained   fall prevention program maintained   nonskid shoes/slippers when out of bed   safety round/check completed  Taken 5/15/2025 2040 by Xavier Todd RN  Safety Promotion/Fall Prevention:   activity supervised   assistive device/personal items within reach   fall prevention program maintained   nonskid shoes/slippers when out of bed   safety round/check completed  Intervention: Prevent Skin Injury  Recent Flowsheet Documentation  Taken 5/16/2025 0200 by Xavier Todd RN  Body Position: position changed  independently  Skin Protection: transparent dressing maintained  Taken 5/16/2025 0000 by Xavier Todd RN  Body Position: position changed independently  Skin Protection: transparent dressing maintained  Taken 5/15/2025 2040 by Xavier Todd RN  Body Position: position changed independently  Skin Protection:   transparent dressing maintained   incontinence pads utilized  Intervention: Prevent and Manage VTE (Venous Thromboembolism) Risk  Recent Flowsheet Documentation  Taken 5/15/2025 2040 by Xavier Todd RN  VTE Prevention/Management:   bilateral   SCDs (sequential compression devices) on  Intervention: Prevent Infection  Recent Flowsheet Documentation  Taken 5/16/2025 0200 by Xavier Todd RN  Infection Prevention:   hand hygiene promoted   environmental surveillance performed   rest/sleep promoted  Taken 5/16/2025 0000 by Xavier Todd RN  Infection Prevention:   environmental surveillance performed   hand hygiene promoted   rest/sleep promoted  Taken 5/15/2025 2200 by Xavier Todd RN  Infection Prevention:   environmental surveillance performed   rest/sleep promoted   hand hygiene promoted  Taken 5/15/2025 2040 by Xavier Todd RN  Infection Prevention:   environmental surveillance performed   hand hygiene promoted   rest/sleep promoted  Goal: Optimal Comfort and Wellbeing  Outcome: Progressing  Intervention: Provide Person-Centered Care  Recent Flowsheet Documentation  Taken 5/15/2025 2040 by Xavier Todd RN  Trust Relationship/Rapport:   care explained   choices provided   thoughts/feelings acknowledged  Goal: Readiness for Transition of Care  Outcome: Progressing     Problem: Comorbidity Management  Goal: Blood Pressure in Desired Range  Outcome: Progressing  Intervention: Maintain Blood Pressure Management  Recent Flowsheet Documentation  Taken 5/16/2025 0200 by Xavier Todd RN  Medication Review/Management: medications reviewed  Taken 5/16/2025 0000  by Xavier Todd, RN  Medication Review/Management: medications reviewed  Taken 5/15/2025 2200 by Xavier Todd RN  Medication Review/Management: medications reviewed  Taken 5/15/2025 2040 by Xavier Todd RN  Medication Review/Management: medications reviewed     Problem: Nausea and Vomiting  Goal: Nausea and Vomiting Relief  Outcome: Progressing   Goal Outcome Evaluation:  Plan of Care Reviewed With: patient        Progress: improving  Outcome Evaluation: - VSS, 2L NC, A&Ox4, Sinus Bradycardia on the monitor. No complaints of pain or nausea. Patient has ambulated around the unit. Urine blood-tinged. Patient resting comfortably. Will continue plan of care.

## 2025-05-16 NOTE — CASE MANAGEMENT/SOCIAL WORK
Continued Stay Note  [facility]     Patient Name: Thierry Garduno  MRN: [id number]  Today's Date: 5/16/2025    Admit Date: 5/14/2025    Plan: home   Discharge Plan       Row Name 05/16/25 0837       Plan    Plan home    Patient/Family in Agreement with Plan yes    Plan Comments I met with this patient bedside. His plan remains home with his SO to transport. He is uncertain when he will be discharged, possibly today. CM to follow.    Final Discharge Disposition Code 01 - home or self-care                   Discharge Codes    No documentation.                 Expected Discharge Date and Time       Expected Discharge Date Expected Discharge Time    May 16, 2025               Sana Pizano RN

## 2025-05-27 LAB
CALCIUM OXALATE DIHYDRATE MFR STONE IR: 30 %
COLOR STONE: NORMAL
COM MFR STONE: 70 %
LABORATORY COMMENT REPORT: NORMAL
SIZE STONE: NORMAL MM
SPEC SOURCE SUBJ: NORMAL
WT STONE: 8 MG

## (undated) DEVICE — BLANKT WARM UPPR/BDY ARM/OUT 57X196CM

## (undated) DEVICE — NITINOL STONE RETRIEVAL BASKET: Brand: ZERO TIP

## (undated) DEVICE — SYR LUERLOK 30CC

## (undated) DEVICE — GLV SURG SENSICARE W/ALOE PF LF 7.5 STRL

## (undated) DEVICE — GLV SURG SENSICARE PI MIC PF SZ7.5 LF STRL

## (undated) DEVICE — NITINOL WIRE WITH HYDROPHILIC TIP: Brand: SENSOR

## (undated) DEVICE — SYR LL TP 10ML STRL

## (undated) DEVICE — PK CYSTO-TUR BASIC 10

## (undated) DEVICE — CVR FTSWITCH UNIV

## (undated) DEVICE — FIBR LASR SOLTIVE BALL/TP 200MICRON 1P/U